# Patient Record
Sex: FEMALE | Race: WHITE | NOT HISPANIC OR LATINO
[De-identification: names, ages, dates, MRNs, and addresses within clinical notes are randomized per-mention and may not be internally consistent; named-entity substitution may affect disease eponyms.]

---

## 2018-06-18 PROBLEM — Z00.00 ENCOUNTER FOR PREVENTIVE HEALTH EXAMINATION: Status: ACTIVE | Noted: 2018-06-18

## 2018-07-24 ENCOUNTER — APPOINTMENT (OUTPATIENT)
Dept: OBGYN | Facility: CLINIC | Age: 33
End: 2018-07-24

## 2018-08-21 ENCOUNTER — APPOINTMENT (OUTPATIENT)
Dept: OBGYN | Facility: CLINIC | Age: 33
End: 2018-08-21
Payer: COMMERCIAL

## 2018-08-21 VITALS
HEIGHT: 63 IN | BODY MASS INDEX: 25.58 KG/M2 | DIASTOLIC BLOOD PRESSURE: 72 MMHG | WEIGHT: 144.38 LBS | SYSTOLIC BLOOD PRESSURE: 106 MMHG

## 2018-08-21 DIAGNOSIS — Z83.49 FAMILY HISTORY OF OTHER ENDOCRINE, NUTRITIONAL AND METABOLIC DISEASES: ICD-10-CM

## 2018-08-21 DIAGNOSIS — Z78.9 OTHER SPECIFIED HEALTH STATUS: ICD-10-CM

## 2018-08-21 DIAGNOSIS — Z83.3 FAMILY HISTORY OF DIABETES MELLITUS: ICD-10-CM

## 2018-08-21 PROCEDURE — 0501F PRENATAL FLOW SHEET: CPT

## 2018-08-21 RX ORDER — ASCORBIC ACID, CHOLECALCIFEROL, .ALPHA.-TOCOPHEROL ACETATE, DL-, PYRIDOXINE, FOLIC ACID, CYANOCOBALAMIN, CALCIUM, FERROUS FUMARATE, MAGNESIUM, DOCONEXENT 85; 200; 10; 25; 1; 12; 140; 27; 45; 300 [IU]/1; [IU]/1; [IU]/1; [IU]/1; MG/1; UG/1; MG/1; MG/1; MG/1; MG/1
CAPSULE, GELATIN COATED ORAL
Refills: 0 | Status: ACTIVE | COMMUNITY

## 2018-08-22 ENCOUNTER — ASOB RESULT (OUTPATIENT)
Age: 33
End: 2018-08-22

## 2018-08-22 ENCOUNTER — LABORATORY RESULT (OUTPATIENT)
Age: 33
End: 2018-08-22

## 2018-08-22 ENCOUNTER — APPOINTMENT (OUTPATIENT)
Dept: ANTEPARTUM | Facility: CLINIC | Age: 33
End: 2018-08-22
Payer: COMMERCIAL

## 2018-08-22 PROCEDURE — 76801 OB US < 14 WKS SINGLE FETUS: CPT

## 2018-08-22 PROCEDURE — 36416 COLLJ CAPILLARY BLOOD SPEC: CPT

## 2018-08-22 PROCEDURE — 76813 OB US NUCHAL MEAS 1 GEST: CPT

## 2018-08-28 ENCOUNTER — NON-APPOINTMENT (OUTPATIENT)
Age: 33
End: 2018-08-28

## 2018-08-28 PROBLEM — Z78.9 DOES NOT USE ILLICIT DRUGS: Status: ACTIVE | Noted: 2018-08-28

## 2018-08-28 PROBLEM — Z83.49 FAMILY HISTORY OF THYROID DISEASE: Status: ACTIVE | Noted: 2018-08-28

## 2018-08-28 PROBLEM — Z78.9 DENIES ALCOHOL CONSUMPTION: Status: ACTIVE | Noted: 2018-08-28

## 2018-08-28 PROBLEM — Z83.3 FAMILY HISTORY OF DIABETES MELLITUS: Status: ACTIVE | Noted: 2018-08-28

## 2018-08-28 PROBLEM — Z78.9 NON-SMOKER: Status: ACTIVE | Noted: 2018-08-28

## 2018-08-28 LAB
ABO + RH PNL BLD: NORMAL
B19V IGG SER QL IA: 6.2 INDEX
B19V IGG+IGM SER-IMP: NORMAL
B19V IGG+IGM SER-IMP: POSITIVE
B19V IGM FLD-ACNC: 0.2 INDEX
B19V IGM SER-ACNC: NEGATIVE
BACTERIA UR CULT: ABNORMAL
BASOPHILS # BLD AUTO: 0.02 K/UL
BASOPHILS NFR BLD AUTO: 0.3 %
BLD GP AB SCN SERPL QL: NORMAL
CMV IGG SERPL QL: <0.2 U/ML
CMV IGG SERPL-IMP: NEGATIVE
CMV IGM SERPL QL: <8 AU/ML
CMV IGM SERPL QL: NEGATIVE
EOSINOPHIL # BLD AUTO: 0.03 K/UL
EOSINOPHIL NFR BLD AUTO: 0.4 %
HBV SURFACE AG SER QL: NONREACTIVE
HCT VFR BLD CALC: 37.9 %
HCV AB SER QL: NONREACTIVE
HCV S/CO RATIO: 0.36 S/CO
HGB BLD-MCNC: 13 G/DL
HIV1+2 AB SPEC QL IA.RAPID: NONREACTIVE
IMM GRANULOCYTES NFR BLD AUTO: 0.1 %
LEAD BLD-MCNC: <1 UG/DL
LYMPHOCYTES # BLD AUTO: 1.34 K/UL
LYMPHOCYTES NFR BLD AUTO: 19 %
MAN DIFF?: NORMAL
MCHC RBC-ENTMCNC: 32.3 PG
MCHC RBC-ENTMCNC: 34.3 GM/DL
MCV RBC AUTO: 94 FL
MONOCYTES # BLD AUTO: 0.45 K/UL
MONOCYTES NFR BLD AUTO: 6.4 %
NEUTROPHILS # BLD AUTO: 5.22 K/UL
NEUTROPHILS NFR BLD AUTO: 73.8 %
PLATELET # BLD AUTO: 257 K/UL
RBC # BLD: 4.03 M/UL
RBC # FLD: 12.9 %
RUBV IGG FLD-ACNC: 4.1 INDEX
RUBV IGG SER-IMP: POSITIVE
T PALLIDUM AB SER QL IA: NEGATIVE
VZV AB TITR SER: POSITIVE
VZV IGG SER IF-ACNC: 476.7 INDEX
WBC # FLD AUTO: 7.07 K/UL

## 2018-09-25 ENCOUNTER — APPOINTMENT (OUTPATIENT)
Dept: OBGYN | Facility: CLINIC | Age: 33
End: 2018-09-25

## 2018-10-01 ENCOUNTER — APPOINTMENT (OUTPATIENT)
Dept: OBGYN | Facility: CLINIC | Age: 33
End: 2018-10-01
Payer: COMMERCIAL

## 2018-10-01 ENCOUNTER — LABORATORY RESULT (OUTPATIENT)
Age: 33
End: 2018-10-01

## 2018-10-01 VITALS
DIASTOLIC BLOOD PRESSURE: 66 MMHG | SYSTOLIC BLOOD PRESSURE: 104 MMHG | BODY MASS INDEX: 27.11 KG/M2 | WEIGHT: 153 LBS | HEIGHT: 63 IN

## 2018-10-01 PROCEDURE — 0502F SUBSEQUENT PRENATAL CARE: CPT

## 2018-10-02 ENCOUNTER — NON-APPOINTMENT (OUTPATIENT)
Age: 33
End: 2018-10-02

## 2018-10-03 ENCOUNTER — OTHER (OUTPATIENT)
Age: 33
End: 2018-10-03

## 2018-10-03 LAB — BACTERIA UR CULT: NORMAL

## 2018-10-09 ENCOUNTER — OTHER (OUTPATIENT)
Age: 33
End: 2018-10-09

## 2018-10-10 ENCOUNTER — ASOB RESULT (OUTPATIENT)
Age: 33
End: 2018-10-10

## 2018-10-10 ENCOUNTER — APPOINTMENT (OUTPATIENT)
Dept: ANTEPARTUM | Facility: CLINIC | Age: 33
End: 2018-10-10
Payer: COMMERCIAL

## 2018-10-10 PROCEDURE — 76805 OB US >/= 14 WKS SNGL FETUS: CPT

## 2018-10-15 ENCOUNTER — APPOINTMENT (OUTPATIENT)
Dept: OBGYN | Facility: CLINIC | Age: 33
End: 2018-10-15
Payer: COMMERCIAL

## 2018-10-15 PROCEDURE — 99211 OFF/OP EST MAY X REQ PHY/QHP: CPT

## 2018-10-30 ENCOUNTER — APPOINTMENT (OUTPATIENT)
Dept: OBGYN | Facility: CLINIC | Age: 33
End: 2018-10-30
Payer: COMMERCIAL

## 2018-10-30 ENCOUNTER — NON-APPOINTMENT (OUTPATIENT)
Age: 33
End: 2018-10-30

## 2018-10-30 VITALS
BODY MASS INDEX: 28 KG/M2 | WEIGHT: 158 LBS | HEIGHT: 63 IN | DIASTOLIC BLOOD PRESSURE: 73 MMHG | SYSTOLIC BLOOD PRESSURE: 116 MMHG

## 2018-10-30 LAB — BACTERIA UR CULT: NORMAL

## 2018-10-30 PROCEDURE — 0502F SUBSEQUENT PRENATAL CARE: CPT

## 2018-10-30 PROCEDURE — 90471 IMMUNIZATION ADMIN: CPT

## 2018-10-30 PROCEDURE — 90688 IIV4 VACCINE SPLT 0.5 ML IM: CPT

## 2018-11-29 ENCOUNTER — APPOINTMENT (OUTPATIENT)
Dept: OBGYN | Facility: CLINIC | Age: 33
End: 2018-11-29
Payer: COMMERCIAL

## 2018-11-29 ENCOUNTER — NON-APPOINTMENT (OUTPATIENT)
Age: 33
End: 2018-11-29

## 2018-11-29 VITALS
DIASTOLIC BLOOD PRESSURE: 64 MMHG | HEIGHT: 63 IN | SYSTOLIC BLOOD PRESSURE: 95 MMHG | WEIGHT: 164 LBS | BODY MASS INDEX: 29.06 KG/M2

## 2018-11-29 PROCEDURE — 0502F SUBSEQUENT PRENATAL CARE: CPT

## 2018-11-30 ENCOUNTER — OTHER (OUTPATIENT)
Age: 33
End: 2018-11-30

## 2018-11-30 LAB
BASOPHILS # BLD AUTO: 0.01 K/UL
BASOPHILS NFR BLD AUTO: 0.1 %
EOSINOPHIL # BLD AUTO: 0.06 K/UL
EOSINOPHIL NFR BLD AUTO: 0.5 %
GLUCOSE 1H P 50 G GLC PO SERPL-MCNC: 115 MG/DL
HCT VFR BLD CALC: 32.3 %
HGB BLD-MCNC: 11 G/DL
IMM GRANULOCYTES NFR BLD AUTO: 0.5 %
LYMPHOCYTES # BLD AUTO: 1.88 K/UL
LYMPHOCYTES NFR BLD AUTO: 16.7 %
MAN DIFF?: NORMAL
MCHC RBC-ENTMCNC: 32.9 PG
MCHC RBC-ENTMCNC: 34.1 GM/DL
MCV RBC AUTO: 96.7 FL
MONOCYTES # BLD AUTO: 0.56 K/UL
MONOCYTES NFR BLD AUTO: 5 %
NEUTROPHILS # BLD AUTO: 8.66 K/UL
NEUTROPHILS NFR BLD AUTO: 77.2 %
PLATELET # BLD AUTO: 225 K/UL
RBC # BLD: 3.34 M/UL
RBC # FLD: 13.3 %
WBC # FLD AUTO: 11.23 K/UL

## 2018-12-18 ENCOUNTER — NON-APPOINTMENT (OUTPATIENT)
Age: 33
End: 2018-12-18

## 2018-12-18 ENCOUNTER — APPOINTMENT (OUTPATIENT)
Dept: OBGYN | Facility: CLINIC | Age: 33
End: 2018-12-18
Payer: COMMERCIAL

## 2018-12-18 VITALS
HEIGHT: 63 IN | BODY MASS INDEX: 29.77 KG/M2 | DIASTOLIC BLOOD PRESSURE: 57 MMHG | WEIGHT: 168 LBS | SYSTOLIC BLOOD PRESSURE: 97 MMHG

## 2018-12-18 PROCEDURE — 90471 IMMUNIZATION ADMIN: CPT

## 2018-12-18 PROCEDURE — 0502F SUBSEQUENT PRENATAL CARE: CPT

## 2018-12-18 PROCEDURE — 90715 TDAP VACCINE 7 YRS/> IM: CPT

## 2019-01-03 ENCOUNTER — APPOINTMENT (OUTPATIENT)
Dept: OBGYN | Facility: CLINIC | Age: 34
End: 2019-01-03
Payer: COMMERCIAL

## 2019-01-03 ENCOUNTER — NON-APPOINTMENT (OUTPATIENT)
Age: 34
End: 2019-01-03

## 2019-01-03 VITALS
HEIGHT: 63 IN | WEIGHT: 170 LBS | DIASTOLIC BLOOD PRESSURE: 71 MMHG | SYSTOLIC BLOOD PRESSURE: 113 MMHG | BODY MASS INDEX: 30.12 KG/M2

## 2019-01-03 PROCEDURE — 0502F SUBSEQUENT PRENATAL CARE: CPT

## 2019-01-04 ENCOUNTER — OTHER (OUTPATIENT)
Age: 34
End: 2019-01-04

## 2019-01-07 ENCOUNTER — NON-APPOINTMENT (OUTPATIENT)
Age: 34
End: 2019-01-07

## 2019-01-07 ENCOUNTER — APPOINTMENT (OUTPATIENT)
Dept: CARDIOLOGY | Facility: CLINIC | Age: 34
End: 2019-01-07
Payer: COMMERCIAL

## 2019-01-07 VITALS
RESPIRATION RATE: 20 BRPM | OXYGEN SATURATION: 97 % | SYSTOLIC BLOOD PRESSURE: 107 MMHG | TEMPERATURE: 98.1 F | DIASTOLIC BLOOD PRESSURE: 67 MMHG | BODY MASS INDEX: 30.83 KG/M2 | HEART RATE: 100 BPM | HEIGHT: 63 IN | WEIGHT: 174 LBS

## 2019-01-07 DIAGNOSIS — R06.00 DYSPNEA, UNSPECIFIED: ICD-10-CM

## 2019-01-07 PROCEDURE — 99203 OFFICE O/P NEW LOW 30 MIN: CPT

## 2019-01-07 PROCEDURE — 93000 ELECTROCARDIOGRAM COMPLETE: CPT

## 2019-01-07 NOTE — DISCUSSION/SUMMARY
[FreeTextEntry1] : 33 year old  33 weeks pregnant who comes in for dyspnea and heartracing\par -will check TTE to ensure not cardiac in origin\par -if negative, likely due to pregnancy

## 2019-01-07 NOTE — PHYSICAL EXAM
[General Appearance - Well Developed] : well developed [Normal Appearance] : normal appearance [Well Groomed] : well groomed [General Appearance - Well Nourished] : well nourished [No Deformities] : no deformities [General Appearance - In No Acute Distress] : no acute distress [Normal Conjunctiva] : the conjunctiva exhibited no abnormalities [Eyelids - No Xanthelasma] : the eyelids demonstrated no xanthelasmas [Normal Oral Mucosa] : normal oral mucosa [No Oral Pallor] : no oral pallor [No Oral Cyanosis] : no oral cyanosis [Normal Jugular Venous A Waves Present] : normal jugular venous A waves present [Normal Jugular Venous V Waves Present] : normal jugular venous V waves present [No Jugular Venous Bradshaw A Waves] : no jugular venous bradshaw A waves [Heart Rate And Rhythm] : heart rate and rhythm were normal [Heart Sounds] : normal S1 and S2 [Murmurs] : no murmurs present [Respiration, Rhythm And Depth] : normal respiratory rhythm and effort [Exaggerated Use Of Accessory Muscles For Inspiration] : no accessory muscle use [Auscultation Breath Sounds / Voice Sounds] : lungs were clear to auscultation bilaterally [Abdomen Soft] : soft [Abdomen Tenderness] : non-tender [Abdomen Mass (___ Cm)] : no abdominal mass palpated [Nail Clubbing] : no clubbing of the fingernails [Cyanosis, Localized] : no localized cyanosis [Petechial Hemorrhages (___cm)] : no petechial hemorrhages [Skin Color & Pigmentation] : normal skin color and pigmentation [] : no rash [No Venous Stasis] : no venous stasis [Skin Lesions] : no skin lesions [No Skin Ulcers] : no skin ulcer [No Xanthoma] : no  xanthoma was observed [Oriented To Time, Place, And Person] : oriented to person, place, and time [Affect] : the affect was normal [Mood] : the mood was normal [No Anxiety] : not feeling anxious

## 2019-01-07 NOTE — HISTORY OF PRESENT ILLNESS
[FreeTextEntry1] : 33 year old woman  approx 34 weeks pregnant JESSICA 2019 who comes in for evaluation of dyspnea and heart racing. Reported this to her OB and told to followup with cardio. No family history. Was started on Iron supplementation for anemia but that has not helped with her symptoms.  States that she gets short of breath, then gets anxious and feels numbness and tingling and occasionally chest pain.

## 2019-01-09 ENCOUNTER — ASOB RESULT (OUTPATIENT)
Age: 34
End: 2019-01-09

## 2019-01-09 ENCOUNTER — APPOINTMENT (OUTPATIENT)
Dept: ANTEPARTUM | Facility: CLINIC | Age: 34
End: 2019-01-09
Payer: COMMERCIAL

## 2019-01-09 PROCEDURE — 76816 OB US FOLLOW-UP PER FETUS: CPT

## 2019-01-09 PROCEDURE — 76819 FETAL BIOPHYS PROFIL W/O NST: CPT

## 2019-01-11 ENCOUNTER — APPOINTMENT (OUTPATIENT)
Dept: CV DIAGNOSITCS | Facility: HOSPITAL | Age: 34
End: 2019-01-11
Payer: COMMERCIAL

## 2019-01-11 ENCOUNTER — OUTPATIENT (OUTPATIENT)
Dept: OUTPATIENT SERVICES | Facility: HOSPITAL | Age: 34
LOS: 1 days | End: 2019-01-11

## 2019-01-11 DIAGNOSIS — R06.00 DYSPNEA, UNSPECIFIED: ICD-10-CM

## 2019-01-11 PROCEDURE — 93306 TTE W/DOPPLER COMPLETE: CPT | Mod: 26

## 2019-01-17 ENCOUNTER — NON-APPOINTMENT (OUTPATIENT)
Age: 34
End: 2019-01-17

## 2019-01-17 ENCOUNTER — APPOINTMENT (OUTPATIENT)
Dept: OBGYN | Facility: CLINIC | Age: 34
End: 2019-01-17
Payer: COMMERCIAL

## 2019-01-17 VITALS
WEIGHT: 172.5 LBS | BODY MASS INDEX: 30.56 KG/M2 | DIASTOLIC BLOOD PRESSURE: 66 MMHG | HEIGHT: 63 IN | SYSTOLIC BLOOD PRESSURE: 116 MMHG

## 2019-01-17 PROCEDURE — 0502F SUBSEQUENT PRENATAL CARE: CPT

## 2019-01-23 ENCOUNTER — INPATIENT (INPATIENT)
Facility: HOSPITAL | Age: 34
LOS: 0 days | Discharge: HOME | End: 2019-01-24
Attending: OBSTETRICS & GYNECOLOGY | Admitting: OBSTETRICS & GYNECOLOGY
Payer: COMMERCIAL

## 2019-01-23 VITALS — HEIGHT: 63 IN | WEIGHT: 169.98 LBS

## 2019-01-23 DIAGNOSIS — Z3A.00 WEEKS OF GESTATION OF PREGNANCY NOT SPECIFIED: ICD-10-CM

## 2019-01-23 DIAGNOSIS — O26.90 PREGNANCY RELATED CONDITIONS, UNSPECIFIED, UNSPECIFIED TRIMESTER: ICD-10-CM

## 2019-01-23 LAB
APPEARANCE UR: SIGNIFICANT CHANGE UP
BACTERIA # UR AUTO: SIGNIFICANT CHANGE UP
BASOPHILS # BLD AUTO: 0.04 K/UL — SIGNIFICANT CHANGE UP (ref 0–0.2)
BASOPHILS NFR BLD AUTO: 0.3 % — SIGNIFICANT CHANGE UP (ref 0–2)
BILIRUB UR-MCNC: NEGATIVE — SIGNIFICANT CHANGE UP
BLD GP AB SCN SERPL QL: NEGATIVE — SIGNIFICANT CHANGE UP
BLOOD UR QL VISUAL: NEGATIVE — SIGNIFICANT CHANGE UP
COLOR SPEC: SIGNIFICANT CHANGE UP
EOSINOPHIL # BLD AUTO: 0.07 K/UL — SIGNIFICANT CHANGE UP (ref 0–0.5)
EOSINOPHIL NFR BLD AUTO: 0.6 % — SIGNIFICANT CHANGE UP (ref 0–6)
GLUCOSE UR-MCNC: NEGATIVE — SIGNIFICANT CHANGE UP
HCT VFR BLD CALC: 36.8 % — SIGNIFICANT CHANGE UP (ref 34.5–45)
HGB BLD-MCNC: 12.4 G/DL — SIGNIFICANT CHANGE UP (ref 11.5–15.5)
HYALINE CASTS # UR AUTO: NEGATIVE — SIGNIFICANT CHANGE UP
IMM GRANULOCYTES NFR BLD AUTO: 1 % — SIGNIFICANT CHANGE UP (ref 0–1.5)
KETONES UR-MCNC: NEGATIVE — SIGNIFICANT CHANGE UP
LEUKOCYTE ESTERASE UR-ACNC: SIGNIFICANT CHANGE UP
LYMPHOCYTES # BLD AUTO: 17.9 % — SIGNIFICANT CHANGE UP (ref 13–44)
LYMPHOCYTES # BLD AUTO: 2.24 K/UL — SIGNIFICANT CHANGE UP (ref 1–3.3)
MCHC RBC-ENTMCNC: 33 PG — SIGNIFICANT CHANGE UP (ref 27–34)
MCHC RBC-ENTMCNC: 33.7 % — SIGNIFICANT CHANGE UP (ref 32–36)
MCV RBC AUTO: 97.9 FL — SIGNIFICANT CHANGE UP (ref 80–100)
MONOCYTES # BLD AUTO: 0.99 K/UL — HIGH (ref 0–0.9)
MONOCYTES NFR BLD AUTO: 7.9 % — SIGNIFICANT CHANGE UP (ref 2–14)
NEUTROPHILS # BLD AUTO: 9.01 K/UL — HIGH (ref 1.8–7.4)
NEUTROPHILS NFR BLD AUTO: 72.3 % — SIGNIFICANT CHANGE UP (ref 43–77)
NITRITE UR-MCNC: NEGATIVE — SIGNIFICANT CHANGE UP
NRBC # FLD: 0 K/UL — LOW (ref 25–125)
PH UR: 7.5 — SIGNIFICANT CHANGE UP (ref 5–8)
PLATELET # BLD AUTO: 210 K/UL — SIGNIFICANT CHANGE UP (ref 150–400)
PMV BLD: 10.5 FL — SIGNIFICANT CHANGE UP (ref 7–13)
PROT UR-MCNC: 10 — SIGNIFICANT CHANGE UP
RBC # BLD: 3.76 M/UL — LOW (ref 3.8–5.2)
RBC # FLD: 13 % — SIGNIFICANT CHANGE UP (ref 10.3–14.5)
RBC CASTS # UR COMP ASSIST: SIGNIFICANT CHANGE UP (ref 0–?)
RH IG SCN BLD-IMP: POSITIVE — SIGNIFICANT CHANGE UP
RH IG SCN BLD-IMP: POSITIVE — SIGNIFICANT CHANGE UP
SP GR SPEC: 1.01 — SIGNIFICANT CHANGE UP (ref 1–1.04)
SQUAMOUS # UR AUTO: SIGNIFICANT CHANGE UP
T PALLIDUM AB TITR SER: NEGATIVE — SIGNIFICANT CHANGE UP
UROBILINOGEN FLD QL: NORMAL — SIGNIFICANT CHANGE UP
WBC # BLD: 12.48 K/UL — HIGH (ref 3.8–10.5)
WBC # FLD AUTO: 12.48 K/UL — HIGH (ref 3.8–10.5)
WBC UR QL: >50 — HIGH (ref 0–?)

## 2019-01-23 PROCEDURE — 99222 1ST HOSP IP/OBS MODERATE 55: CPT

## 2019-01-23 RX ORDER — SODIUM CHLORIDE 9 MG/ML
1000 INJECTION, SOLUTION INTRAVENOUS ONCE
Qty: 0 | Refills: 0 | Status: COMPLETED | OUTPATIENT
Start: 2019-01-23 | End: 2019-01-23

## 2019-01-23 RX ORDER — SODIUM CHLORIDE 9 MG/ML
1000 INJECTION, SOLUTION INTRAVENOUS
Qty: 0 | Refills: 0 | Status: DISCONTINUED | OUTPATIENT
Start: 2019-01-23 | End: 2019-01-23

## 2019-01-23 RX ORDER — CEFAZOLIN SODIUM 1 G
1000 VIAL (EA) INJECTION EVERY 8 HOURS
Qty: 0 | Refills: 0 | Status: DISCONTINUED | OUTPATIENT
Start: 2019-01-23 | End: 2019-01-24

## 2019-01-23 RX ORDER — SODIUM CHLORIDE 9 MG/ML
1000 INJECTION, SOLUTION INTRAVENOUS ONCE
Qty: 0 | Refills: 0 | Status: DISCONTINUED | OUTPATIENT
Start: 2019-01-23 | End: 2019-01-23

## 2019-01-23 RX ORDER — OXYTOCIN 10 UNIT/ML
333.33 VIAL (ML) INJECTION
Qty: 20 | Refills: 0 | Status: DISCONTINUED | OUTPATIENT
Start: 2019-01-23 | End: 2019-01-23

## 2019-01-23 RX ORDER — CEFAZOLIN SODIUM 1 G
2000 VIAL (EA) INJECTION ONCE
Qty: 0 | Refills: 0 | Status: COMPLETED | OUTPATIENT
Start: 2019-01-23 | End: 2019-01-23

## 2019-01-23 RX ORDER — CEFAZOLIN SODIUM 1 G
VIAL (EA) INJECTION
Qty: 0 | Refills: 0 | Status: DISCONTINUED | OUTPATIENT
Start: 2019-01-23 | End: 2019-01-23

## 2019-01-23 RX ADMIN — Medication 100 MILLIGRAM(S): at 23:00

## 2019-01-23 RX ADMIN — Medication 12 MILLIGRAM(S): at 05:35

## 2019-01-23 RX ADMIN — SODIUM CHLORIDE 1000 MILLILITER(S): 9 INJECTION, SOLUTION INTRAVENOUS at 05:24

## 2019-01-23 RX ADMIN — Medication 100 MILLIGRAM(S): at 05:22

## 2019-01-23 RX ADMIN — Medication 100 MILLIGRAM(S): at 13:56

## 2019-01-24 ENCOUNTER — TRANSCRIPTION ENCOUNTER (OUTPATIENT)
Age: 34
End: 2019-01-24

## 2019-01-24 ENCOUNTER — APPOINTMENT (OUTPATIENT)
Dept: ANTEPARTUM | Facility: CLINIC | Age: 34
End: 2019-01-24
Payer: COMMERCIAL

## 2019-01-24 ENCOUNTER — ASOB RESULT (OUTPATIENT)
Age: 34
End: 2019-01-24

## 2019-01-24 ENCOUNTER — OUTPATIENT (OUTPATIENT)
Dept: OUTPATIENT SERVICES | Facility: HOSPITAL | Age: 34
LOS: 1 days | End: 2019-01-24

## 2019-01-24 VITALS
SYSTOLIC BLOOD PRESSURE: 99 MMHG | TEMPERATURE: 99 F | RESPIRATION RATE: 17 BRPM | OXYGEN SATURATION: 97 % | DIASTOLIC BLOOD PRESSURE: 55 MMHG | HEART RATE: 73 BPM

## 2019-01-24 LAB
BACTERIA UR CULT: SIGNIFICANT CHANGE UP
SPECIMEN SOURCE: SIGNIFICANT CHANGE UP
SPECIMEN SOURCE: SIGNIFICANT CHANGE UP

## 2019-01-24 PROCEDURE — 76819 FETAL BIOPHYS PROFIL W/O NST: CPT | Mod: 26

## 2019-01-24 RX ADMIN — Medication 12 MILLIGRAM(S): at 05:43

## 2019-01-24 RX ADMIN — Medication 100 MILLIGRAM(S): at 05:43

## 2019-01-24 NOTE — DISCHARGE NOTE ANTEPARTUM - PATIENT PORTAL LINK FT
You can access the [facility] Patient Portal, offered by [facility], by registering with the following website: [email or web address]

## 2019-01-24 NOTE — DISCHARGE NOTE ANTEPARTUM - CARE PLAN
Principal Discharge DX:	 labor in third trimester without delivery  Goal:	continuation of pregnancy and prenatal care Principal Discharge DX:	 labor in third trimester without delivery  Goal:	continuation of pregnancy and prenatal care  Assessment and plan of treatment:	F/u as scheduled

## 2019-01-24 NOTE — DISCHARGE NOTE ANTEPARTUM - MEDICATION SUMMARY - MEDICATIONS TO TAKE
I will START or STAY ON the medications listed below when I get home from the hospital:    prenatal vitamins  -- 1 tab(s) by mouth once a day  -- Indication: For vitamin    ferrous sulfate 325 mg (65 mg elemental iron) oral tablet  -- 1 tab(s) by mouth 3 times a day  -- Indication: For vitamin

## 2019-01-24 NOTE — DISCHARGE NOTE ANTEPARTUM - HOSPITAL COURSE
Pt presented at 35w4d presented with ctx and was found to be 3/70/-3. She received BMZ on 1/23 and 1/24. She received Ancef for GBS prophylaxis. She no longer felt ctx and was discharged home in stable condition with outpatient follow up. VE unchanged prior to admission.

## 2019-01-24 NOTE — DISCHARGE NOTE ANTEPARTUM - CARE PROVIDER_API CALL
Swathi William (MD), Obstetrics and Gynecology  [facility] [place] [address]  Phone: [phone]  Fax: [phone]

## 2019-01-25 LAB — GP B STREP GENITAL QL CULT: SIGNIFICANT CHANGE UP

## 2019-01-30 DIAGNOSIS — Z3A.35 35 WEEKS GESTATION OF PREGNANCY: ICD-10-CM

## 2019-01-30 DIAGNOSIS — O26.843 UTERINE SIZE-DATE DISCREPANCY, THIRD TRIMESTER: ICD-10-CM

## 2019-01-30 DIAGNOSIS — O60.03 PRETERM LABOR WITHOUT DELIVERY, THIRD TRIMESTER: ICD-10-CM

## 2019-01-31 ENCOUNTER — APPOINTMENT (OUTPATIENT)
Dept: OBGYN | Facility: CLINIC | Age: 34
End: 2019-01-31
Payer: COMMERCIAL

## 2019-01-31 ENCOUNTER — NON-APPOINTMENT (OUTPATIENT)
Age: 34
End: 2019-01-31

## 2019-01-31 VITALS
BODY MASS INDEX: 31.01 KG/M2 | HEIGHT: 63 IN | WEIGHT: 175 LBS | DIASTOLIC BLOOD PRESSURE: 80 MMHG | SYSTOLIC BLOOD PRESSURE: 121 MMHG

## 2019-01-31 PROCEDURE — 0502F SUBSEQUENT PRENATAL CARE: CPT

## 2019-02-04 LAB — BACTERIA UR CULT: NORMAL

## 2019-02-07 ENCOUNTER — APPOINTMENT (OUTPATIENT)
Dept: OBGYN | Facility: CLINIC | Age: 34
End: 2019-02-07
Payer: COMMERCIAL

## 2019-02-07 ENCOUNTER — NON-APPOINTMENT (OUTPATIENT)
Age: 34
End: 2019-02-07

## 2019-02-07 VITALS
BODY MASS INDEX: 32.25 KG/M2 | SYSTOLIC BLOOD PRESSURE: 117 MMHG | WEIGHT: 182 LBS | HEIGHT: 63 IN | DIASTOLIC BLOOD PRESSURE: 78 MMHG

## 2019-02-07 PROCEDURE — 0502F SUBSEQUENT PRENATAL CARE: CPT

## 2019-02-10 LAB — BACTERIA UR CULT: NORMAL

## 2019-02-12 ENCOUNTER — APPOINTMENT (OUTPATIENT)
Dept: CARDIOLOGY | Facility: CLINIC | Age: 34
End: 2019-02-12

## 2019-02-14 ENCOUNTER — APPOINTMENT (OUTPATIENT)
Dept: OBGYN | Facility: CLINIC | Age: 34
End: 2019-02-14
Payer: COMMERCIAL

## 2019-02-14 VITALS
WEIGHT: 185 LBS | BODY MASS INDEX: 32.78 KG/M2 | SYSTOLIC BLOOD PRESSURE: 107 MMHG | HEIGHT: 63 IN | DIASTOLIC BLOOD PRESSURE: 71 MMHG

## 2019-02-14 PROCEDURE — 0502F SUBSEQUENT PRENATAL CARE: CPT

## 2019-02-14 PROCEDURE — 59025 FETAL NON-STRESS TEST: CPT

## 2019-02-18 ENCOUNTER — OUTPATIENT (OUTPATIENT)
Dept: OUTPATIENT SERVICES | Facility: HOSPITAL | Age: 34
LOS: 1 days | End: 2019-02-18
Payer: COMMERCIAL

## 2019-02-18 DIAGNOSIS — O26.899 OTHER SPECIFIED PREGNANCY RELATED CONDITIONS, UNSPECIFIED TRIMESTER: ICD-10-CM

## 2019-02-18 DIAGNOSIS — Z3A.00 WEEKS OF GESTATION OF PREGNANCY NOT SPECIFIED: ICD-10-CM

## 2019-02-18 LAB — AMNISURE ROM (RUPTURE OF MEMBRANES): NEGATIVE — SIGNIFICANT CHANGE UP

## 2019-02-18 PROCEDURE — 59025 FETAL NON-STRESS TEST: CPT | Mod: 26

## 2019-02-19 ENCOUNTER — INPATIENT (INPATIENT)
Facility: HOSPITAL | Age: 34
LOS: 2 days | Discharge: HOME | End: 2019-02-22
Attending: OBSTETRICS & GYNECOLOGY | Admitting: OBSTETRICS & GYNECOLOGY
Payer: COMMERCIAL

## 2019-02-19 VITALS — WEIGHT: 180.78 LBS | HEIGHT: 63 IN

## 2019-02-19 DIAGNOSIS — O26.899 OTHER SPECIFIED PREGNANCY RELATED CONDITIONS, UNSPECIFIED TRIMESTER: ICD-10-CM

## 2019-02-19 DIAGNOSIS — Z3A.00 WEEKS OF GESTATION OF PREGNANCY NOT SPECIFIED: ICD-10-CM

## 2019-02-19 LAB
BASOPHILS # BLD AUTO: 0.03 K/UL — SIGNIFICANT CHANGE UP (ref 0–0.2)
BASOPHILS NFR BLD AUTO: 0.3 % — SIGNIFICANT CHANGE UP (ref 0–2)
BLD GP AB SCN SERPL QL: NEGATIVE — SIGNIFICANT CHANGE UP
EOSINOPHIL # BLD AUTO: 0.07 K/UL — SIGNIFICANT CHANGE UP (ref 0–0.5)
EOSINOPHIL NFR BLD AUTO: 0.6 % — SIGNIFICANT CHANGE UP (ref 0–6)
HCT VFR BLD CALC: 38.1 % — SIGNIFICANT CHANGE UP (ref 34.5–45)
HGB BLD-MCNC: 13 G/DL — SIGNIFICANT CHANGE UP (ref 11.5–15.5)
IMM GRANULOCYTES NFR BLD AUTO: 0.8 % — SIGNIFICANT CHANGE UP (ref 0–1.5)
LYMPHOCYTES # BLD AUTO: 1.77 K/UL — SIGNIFICANT CHANGE UP (ref 1–3.3)
LYMPHOCYTES # BLD AUTO: 15.4 % — SIGNIFICANT CHANGE UP (ref 13–44)
MCHC RBC-ENTMCNC: 32.7 PG — SIGNIFICANT CHANGE UP (ref 27–34)
MCHC RBC-ENTMCNC: 34.1 % — SIGNIFICANT CHANGE UP (ref 32–36)
MCV RBC AUTO: 95.7 FL — SIGNIFICANT CHANGE UP (ref 80–100)
MONOCYTES # BLD AUTO: 0.92 K/UL — HIGH (ref 0–0.9)
MONOCYTES NFR BLD AUTO: 8 % — SIGNIFICANT CHANGE UP (ref 2–14)
NEUTROPHILS # BLD AUTO: 8.62 K/UL — HIGH (ref 1.8–7.4)
NEUTROPHILS NFR BLD AUTO: 74.9 % — SIGNIFICANT CHANGE UP (ref 43–77)
NRBC # FLD: 0 K/UL — LOW (ref 25–125)
PLATELET # BLD AUTO: 235 K/UL — SIGNIFICANT CHANGE UP (ref 150–400)
PMV BLD: 10.6 FL — SIGNIFICANT CHANGE UP (ref 7–13)
RBC # BLD: 3.98 M/UL — SIGNIFICANT CHANGE UP (ref 3.8–5.2)
RBC # FLD: 12.7 % — SIGNIFICANT CHANGE UP (ref 10.3–14.5)
RH IG SCN BLD-IMP: POSITIVE — SIGNIFICANT CHANGE UP
WBC # BLD: 11.5 K/UL — HIGH (ref 3.8–10.5)
WBC # FLD AUTO: 11.5 K/UL — HIGH (ref 3.8–10.5)

## 2019-02-19 RX ORDER — OXYTOCIN 10 UNIT/ML
333.33 VIAL (ML) INJECTION
Qty: 20 | Refills: 0 | Status: COMPLETED | OUTPATIENT
Start: 2019-02-19

## 2019-02-19 RX ORDER — SODIUM CHLORIDE 9 MG/ML
1000 INJECTION, SOLUTION INTRAVENOUS
Qty: 0 | Refills: 0 | Status: DISCONTINUED | OUTPATIENT
Start: 2019-02-19 | End: 2019-02-20

## 2019-02-19 RX ORDER — SODIUM CHLORIDE 9 MG/ML
1000 INJECTION, SOLUTION INTRAVENOUS ONCE
Qty: 0 | Refills: 0 | Status: COMPLETED | OUTPATIENT
Start: 2019-02-19 | End: 2019-02-19

## 2019-02-19 RX ADMIN — SODIUM CHLORIDE 250 MILLILITER(S): 9 INJECTION, SOLUTION INTRAVENOUS at 22:00

## 2019-02-19 RX ADMIN — SODIUM CHLORIDE 2000 MILLILITER(S): 9 INJECTION, SOLUTION INTRAVENOUS at 21:30

## 2019-02-20 LAB — T PALLIDUM AB TITR SER: NEGATIVE — SIGNIFICANT CHANGE UP

## 2019-02-20 PROCEDURE — 59400 OBSTETRICAL CARE: CPT | Mod: U9,UB

## 2019-02-20 RX ORDER — OXYCODONE HYDROCHLORIDE 5 MG/1
5 TABLET ORAL EVERY 4 HOURS
Qty: 0 | Refills: 0 | Status: DISCONTINUED | OUTPATIENT
Start: 2019-02-20 | End: 2019-02-22

## 2019-02-20 RX ORDER — IBUPROFEN 200 MG
600 TABLET ORAL EVERY 6 HOURS
Qty: 0 | Refills: 0 | Status: DISCONTINUED | OUTPATIENT
Start: 2019-02-20 | End: 2019-02-22

## 2019-02-20 RX ORDER — ACETAMINOPHEN 500 MG
975 TABLET ORAL EVERY 6 HOURS
Qty: 0 | Refills: 0 | Status: DISCONTINUED | OUTPATIENT
Start: 2019-02-20 | End: 2019-02-22

## 2019-02-20 RX ORDER — SODIUM CHLORIDE 9 MG/ML
3 INJECTION INTRAMUSCULAR; INTRAVENOUS; SUBCUTANEOUS EVERY 8 HOURS
Qty: 0 | Refills: 0 | Status: DISCONTINUED | OUTPATIENT
Start: 2019-02-20 | End: 2019-02-22

## 2019-02-20 RX ORDER — OXYTOCIN 10 UNIT/ML
41.67 VIAL (ML) INJECTION
Qty: 20 | Refills: 0 | Status: DISCONTINUED | OUTPATIENT
Start: 2019-02-20 | End: 2019-02-20

## 2019-02-20 RX ORDER — ACETAMINOPHEN 500 MG
975 TABLET ORAL EVERY 6 HOURS
Qty: 0 | Refills: 0 | Status: COMPLETED | OUTPATIENT
Start: 2019-02-20 | End: 2020-01-19

## 2019-02-20 RX ORDER — OXYCODONE HYDROCHLORIDE 5 MG/1
5 TABLET ORAL
Qty: 0 | Refills: 0 | Status: DISCONTINUED | OUTPATIENT
Start: 2019-02-20 | End: 2019-02-22

## 2019-02-20 RX ORDER — PRAMOXINE HYDROCHLORIDE 150 MG/15G
1 AEROSOL, FOAM RECTAL EVERY 4 HOURS
Qty: 0 | Refills: 0 | Status: DISCONTINUED | OUTPATIENT
Start: 2019-02-20 | End: 2019-02-20

## 2019-02-20 RX ORDER — SODIUM CHLORIDE 9 MG/ML
3 INJECTION INTRAMUSCULAR; INTRAVENOUS; SUBCUTANEOUS EVERY 8 HOURS
Qty: 0 | Refills: 0 | Status: DISCONTINUED | OUTPATIENT
Start: 2019-02-20 | End: 2019-02-20

## 2019-02-20 RX ORDER — DIBUCAINE 1 %
1 OINTMENT (GRAM) RECTAL EVERY 4 HOURS
Qty: 0 | Refills: 0 | Status: DISCONTINUED | OUTPATIENT
Start: 2019-02-20 | End: 2019-02-22

## 2019-02-20 RX ORDER — GLYCERIN ADULT
1 SUPPOSITORY, RECTAL RECTAL AT BEDTIME
Qty: 0 | Refills: 0 | Status: DISCONTINUED | OUTPATIENT
Start: 2019-02-20 | End: 2019-02-22

## 2019-02-20 RX ORDER — DIBUCAINE 1 %
1 OINTMENT (GRAM) RECTAL EVERY 4 HOURS
Qty: 0 | Refills: 0 | Status: DISCONTINUED | OUTPATIENT
Start: 2019-02-20 | End: 2019-02-20

## 2019-02-20 RX ORDER — IBUPROFEN 200 MG
600 TABLET ORAL EVERY 6 HOURS
Qty: 0 | Refills: 0 | Status: COMPLETED | OUTPATIENT
Start: 2019-02-20 | End: 2020-01-19

## 2019-02-20 RX ORDER — HYDROCORTISONE 1 %
1 OINTMENT (GRAM) TOPICAL EVERY 4 HOURS
Qty: 0 | Refills: 0 | Status: DISCONTINUED | OUTPATIENT
Start: 2019-02-20 | End: 2019-02-20

## 2019-02-20 RX ORDER — PRAMOXINE HYDROCHLORIDE 150 MG/15G
1 AEROSOL, FOAM RECTAL EVERY 4 HOURS
Qty: 0 | Refills: 0 | Status: DISCONTINUED | OUTPATIENT
Start: 2019-02-20 | End: 2019-02-22

## 2019-02-20 RX ORDER — AER TRAVELER 0.5 G/1
1 SOLUTION RECTAL; TOPICAL EVERY 4 HOURS
Qty: 0 | Refills: 0 | Status: DISCONTINUED | OUTPATIENT
Start: 2019-02-20 | End: 2019-02-20

## 2019-02-20 RX ORDER — HYDROCORTISONE 1 %
1 OINTMENT (GRAM) TOPICAL EVERY 4 HOURS
Qty: 0 | Refills: 0 | Status: DISCONTINUED | OUTPATIENT
Start: 2019-02-20 | End: 2019-02-22

## 2019-02-20 RX ORDER — TETANUS TOXOID, REDUCED DIPHTHERIA TOXOID AND ACELLULAR PERTUSSIS VACCINE, ADSORBED 5; 2.5; 8; 8; 2.5 [IU]/.5ML; [IU]/.5ML; UG/.5ML; UG/.5ML; UG/.5ML
0.5 SUSPENSION INTRAMUSCULAR ONCE
Qty: 0 | Refills: 0 | Status: DISCONTINUED | OUTPATIENT
Start: 2019-02-20 | End: 2019-02-22

## 2019-02-20 RX ORDER — SIMETHICONE 80 MG/1
80 TABLET, CHEWABLE ORAL EVERY 6 HOURS
Qty: 0 | Refills: 0 | Status: DISCONTINUED | OUTPATIENT
Start: 2019-02-20 | End: 2019-02-22

## 2019-02-20 RX ORDER — AER TRAVELER 0.5 G/1
1 SOLUTION RECTAL; TOPICAL EVERY 4 HOURS
Qty: 0 | Refills: 0 | Status: DISCONTINUED | OUTPATIENT
Start: 2019-02-20 | End: 2019-02-22

## 2019-02-20 RX ORDER — LANOLIN
1 OINTMENT (GRAM) TOPICAL EVERY 6 HOURS
Qty: 0 | Refills: 0 | Status: DISCONTINUED | OUTPATIENT
Start: 2019-02-20 | End: 2019-02-22

## 2019-02-20 RX ORDER — DIPHENHYDRAMINE HCL 50 MG
25 CAPSULE ORAL EVERY 6 HOURS
Qty: 0 | Refills: 0 | Status: DISCONTINUED | OUTPATIENT
Start: 2019-02-20 | End: 2019-02-22

## 2019-02-20 RX ORDER — OXYTOCIN 10 UNIT/ML
333.33 VIAL (ML) INJECTION
Qty: 20 | Refills: 0 | Status: COMPLETED | OUTPATIENT
Start: 2019-02-20 | End: 2019-02-20

## 2019-02-20 RX ORDER — MAGNESIUM HYDROXIDE 400 MG/1
30 TABLET, CHEWABLE ORAL
Qty: 0 | Refills: 0 | Status: DISCONTINUED | OUTPATIENT
Start: 2019-02-20 | End: 2019-02-22

## 2019-02-20 RX ORDER — DOCUSATE SODIUM 100 MG
100 CAPSULE ORAL
Qty: 0 | Refills: 0 | Status: DISCONTINUED | OUTPATIENT
Start: 2019-02-20 | End: 2019-02-22

## 2019-02-20 RX ORDER — KETOROLAC TROMETHAMINE 30 MG/ML
30 SYRINGE (ML) INJECTION ONCE
Qty: 0 | Refills: 0 | Status: DISCONTINUED | OUTPATIENT
Start: 2019-02-20 | End: 2019-02-20

## 2019-02-20 RX ADMIN — Medication 975 MILLIGRAM(S): at 19:30

## 2019-02-20 RX ADMIN — Medication 600 MILLIGRAM(S): at 17:47

## 2019-02-20 RX ADMIN — Medication 30 MILLIGRAM(S): at 11:51

## 2019-02-20 RX ADMIN — SODIUM CHLORIDE 3 MILLILITER(S): 9 INJECTION INTRAMUSCULAR; INTRAVENOUS; SUBCUTANEOUS at 14:19

## 2019-02-20 RX ADMIN — Medication 1000 MILLIUNIT(S)/MIN: at 11:01

## 2019-02-20 RX ADMIN — Medication 975 MILLIGRAM(S): at 17:47

## 2019-02-20 RX ADMIN — Medication 600 MILLIGRAM(S): at 19:30

## 2019-02-20 RX ADMIN — SODIUM CHLORIDE 3 MILLILITER(S): 9 INJECTION INTRAMUSCULAR; INTRAVENOUS; SUBCUTANEOUS at 22:00

## 2019-02-20 RX ADMIN — SODIUM CHLORIDE 250 MILLILITER(S): 9 INJECTION, SOLUTION INTRAVENOUS at 08:03

## 2019-02-20 RX ADMIN — Medication 125 MILLIUNIT(S)/MIN: at 11:01

## 2019-02-20 NOTE — LACTATION INITIAL EVALUATION - LACTATION INTERVENTIONS
Assisted with positioning to facilitate deep latch.  Encouraged to feed based on cues and follow the feeding log, reviewed.  Taught hand expression.  Encouraged to call for assistance, as needed.  Discussed outpatient resources available, Warm line, breastfeeding support group.  Primary RN made aware of consult./initiate skin to skin/initiate hand expression routine

## 2019-02-21 ENCOUNTER — APPOINTMENT (OUTPATIENT)
Dept: OBGYN | Facility: CLINIC | Age: 34
End: 2019-02-21

## 2019-02-21 ENCOUNTER — TRANSCRIPTION ENCOUNTER (OUTPATIENT)
Age: 34
End: 2019-02-21

## 2019-02-21 RX ORDER — IBUPROFEN 200 MG
1 TABLET ORAL
Qty: 0 | Refills: 0 | DISCHARGE
Start: 2019-02-21

## 2019-02-21 RX ORDER — FERROUS SULFATE 325(65) MG
1 TABLET ORAL
Qty: 0 | Refills: 0 | COMMUNITY

## 2019-02-21 RX ORDER — ACETAMINOPHEN 500 MG
3 TABLET ORAL
Qty: 0 | Refills: 0 | DISCHARGE
Start: 2019-02-21

## 2019-02-21 RX ADMIN — Medication 600 MILLIGRAM(S): at 06:43

## 2019-02-21 RX ADMIN — Medication 600 MILLIGRAM(S): at 19:15

## 2019-02-21 RX ADMIN — Medication 600 MILLIGRAM(S): at 18:15

## 2019-02-21 RX ADMIN — SODIUM CHLORIDE 3 MILLILITER(S): 9 INJECTION INTRAMUSCULAR; INTRAVENOUS; SUBCUTANEOUS at 06:12

## 2019-02-21 RX ADMIN — Medication 600 MILLIGRAM(S): at 23:56

## 2019-02-21 RX ADMIN — Medication 975 MILLIGRAM(S): at 23:56

## 2019-02-21 RX ADMIN — Medication 975 MILLIGRAM(S): at 06:11

## 2019-02-21 RX ADMIN — Medication 975 MILLIGRAM(S): at 06:43

## 2019-02-21 RX ADMIN — Medication 975 MILLIGRAM(S): at 19:15

## 2019-02-21 RX ADMIN — Medication 600 MILLIGRAM(S): at 01:21

## 2019-02-21 RX ADMIN — Medication 600 MILLIGRAM(S): at 12:00

## 2019-02-21 RX ADMIN — Medication 975 MILLIGRAM(S): at 00:17

## 2019-02-21 RX ADMIN — Medication 975 MILLIGRAM(S): at 01:20

## 2019-02-21 RX ADMIN — Medication 600 MILLIGRAM(S): at 06:11

## 2019-02-21 RX ADMIN — Medication 975 MILLIGRAM(S): at 12:00

## 2019-02-21 RX ADMIN — Medication 975 MILLIGRAM(S): at 13:00

## 2019-02-21 RX ADMIN — Medication 975 MILLIGRAM(S): at 18:15

## 2019-02-21 RX ADMIN — Medication 600 MILLIGRAM(S): at 13:00

## 2019-02-21 RX ADMIN — Medication 600 MILLIGRAM(S): at 00:17

## 2019-02-21 NOTE — PROGRESS NOTE ADULT - SUBJECTIVE AND OBJECTIVE BOX
S: Patient doing well. Minimal lochia. Pain controlled. breastfeeding    O: Vital Signs Last 24 Hrs  T(C): 36.9 (2019 05:34), Max: 37 (2019 14:17)  T(F): 98.4 (2019 05:34), Max: 98.6 (2019 14:17)  HR: 98 (2019 05:34) (97 - 98)  BP: 130/80 (2019 05:34) (112/62 - 130/80)  BP(mean): --  RR: 18 (2019 05:34) (16 - 18)  SpO2: 98% (2019 05:34) (98% - 100%)    Gen: NAD  Abd: soft, NT, ND, fundus firm below umbilicus  Ext: no tenderness    Labs:                        13.0   11.50 )-----------( 235      ( 2019 20:45 )             38.1       A: 33y PPD#1 s/p  doing well.     Plan: Continue routine postpartum care. Anticipate d/c home in am.

## 2019-02-21 NOTE — DISCHARGE NOTE OB - HOSPITAL COURSE
Patient status post normal vaginal delivery of liveborn female infant.   Upon discharge, patient is spontaneously voiding, tolerating a regular diet, out of bed, and reports adequate pain control

## 2019-02-21 NOTE — DISCHARGE NOTE OB - PATIENT PORTAL LINK FT
You can access the [facility] Patient Portal, offered by [facility], by registering with the following website: [email or web address]

## 2019-02-21 NOTE — DISCHARGE NOTE OB - CARE PLAN
Principal Discharge DX:	Vaginal delivery  Goal:	return to normal activity  Assessment and plan of treatment:	diet and activity as tolerated

## 2019-02-21 NOTE — DISCHARGE NOTE OB - MATERIALS PROVIDED
Vaccinations/Shaken Baby Prevention Handout/Breastfeeding Guide and Packet/[facility] [facility]/[place]  Immunization Record

## 2019-02-21 NOTE — DISCHARGE NOTE OB - CARE PROVIDER_API CALL
Swathi William (MD)  Obstetrics and Gynecology  [facility] [place] [address]  Phone: [phone]  Fax: [phone]  Follow Up Time:

## 2019-02-22 VITALS
TEMPERATURE: 98 F | RESPIRATION RATE: 18 BRPM | DIASTOLIC BLOOD PRESSURE: 55 MMHG | HEART RATE: 76 BPM | OXYGEN SATURATION: 98 % | SYSTOLIC BLOOD PRESSURE: 104 MMHG

## 2019-02-22 RX ADMIN — Medication 600 MILLIGRAM(S): at 00:30

## 2019-02-22 RX ADMIN — Medication 975 MILLIGRAM(S): at 06:46

## 2019-02-22 RX ADMIN — Medication 975 MILLIGRAM(S): at 07:20

## 2019-02-22 RX ADMIN — Medication 600 MILLIGRAM(S): at 06:46

## 2019-02-22 RX ADMIN — Medication 975 MILLIGRAM(S): at 00:30

## 2019-02-22 RX ADMIN — Medication 600 MILLIGRAM(S): at 12:10

## 2019-02-22 RX ADMIN — Medication 600 MILLIGRAM(S): at 07:20

## 2019-03-01 ENCOUNTER — OTHER (OUTPATIENT)
Age: 34
End: 2019-03-01

## 2019-04-03 ENCOUNTER — APPOINTMENT (OUTPATIENT)
Dept: OBGYN | Facility: CLINIC | Age: 34
End: 2019-04-03
Payer: COMMERCIAL

## 2019-04-03 VITALS
HEIGHT: 63 IN | SYSTOLIC BLOOD PRESSURE: 116 MMHG | HEART RATE: 97 BPM | WEIGHT: 155 LBS | BODY MASS INDEX: 27.46 KG/M2 | DIASTOLIC BLOOD PRESSURE: 77 MMHG

## 2019-04-03 DIAGNOSIS — N63.20 UNSPECIFIED LUMP IN THE LEFT BREAST, UNSPECIFIED QUADRANT: ICD-10-CM

## 2019-04-03 PROCEDURE — 0503F POSTPARTUM CARE VISIT: CPT

## 2019-04-03 NOTE — HISTORY OF PRESENT ILLNESS
[Postpartum Follow Up] : postpartum follow up [Delivery Date: ___] : on [unfilled] [] : delivered by vaginal delivery [Female] : Delivery History: baby girl [Wt. ___] : weighing [unfilled] [Pertussis Vaccine] : Pertussis vaccine administered [Girl] : baby is a girl [Infant's Name ___] : [unfilled] [___ Lbs] : [unfilled] lbs [___ Oz] : [unfilled] oz [Living at Home] : is currently living at home [Bottle Feeding] : bottle feeding [Back to Normal] : is back to normal in size [None] : no vaginal bleeding [Normal] : the vagina was normal [Healing Well] : is healing well [The Right Breast Was Examined] : was normal [Soft] : soft [Complications:___] : no complications [Rhogam] : Rhogam was not administered [Rubella Vaccine] : Rubella vaccine was not administered [BTL] : no tubal ligation [Breastfeeding] : not currently nursing [BF with Difficulty] : nursing without difficulty [Resumed Menses] : has not resumed her menses [Resumed Narberth] : has not resumed intercourse [Intended Contraception] : the patient does not intended to use contraception postpartum [Breast Pain] : no breast pain [S/Sx PP Depression] : no signs/symptoms of postpartum depression [Tender] : non tender [Distended] : not distended

## 2019-04-18 ENCOUNTER — OUTPATIENT (OUTPATIENT)
Dept: OUTPATIENT SERVICES | Facility: HOSPITAL | Age: 34
LOS: 1 days | End: 2019-04-18
Payer: COMMERCIAL

## 2019-04-18 ENCOUNTER — OTHER (OUTPATIENT)
Age: 34
End: 2019-04-18

## 2019-04-18 ENCOUNTER — APPOINTMENT (OUTPATIENT)
Dept: ULTRASOUND IMAGING | Facility: CLINIC | Age: 34
End: 2019-04-18
Payer: COMMERCIAL

## 2019-04-18 DIAGNOSIS — N63.20 UNSPECIFIED LUMP IN THE LEFT BREAST, UNSPECIFIED QUADRANT: ICD-10-CM

## 2019-04-18 PROCEDURE — 76642 ULTRASOUND BREAST LIMITED: CPT | Mod: 26,LT

## 2019-04-18 PROCEDURE — 76642 ULTRASOUND BREAST LIMITED: CPT

## 2019-04-22 ENCOUNTER — RESULT REVIEW (OUTPATIENT)
Age: 34
End: 2019-04-22

## 2019-04-22 ENCOUNTER — OUTPATIENT (OUTPATIENT)
Dept: OUTPATIENT SERVICES | Facility: HOSPITAL | Age: 34
LOS: 1 days | End: 2019-04-22
Payer: COMMERCIAL

## 2019-04-22 ENCOUNTER — APPOINTMENT (OUTPATIENT)
Dept: ULTRASOUND IMAGING | Facility: CLINIC | Age: 34
End: 2019-04-22
Payer: COMMERCIAL

## 2019-04-22 DIAGNOSIS — N60.02 SOLITARY CYST OF LEFT BREAST: ICD-10-CM

## 2019-04-22 DIAGNOSIS — Z00.8 ENCOUNTER FOR OTHER GENERAL EXAMINATION: ICD-10-CM

## 2019-04-22 PROCEDURE — 19083 BX BREAST 1ST LESION US IMAG: CPT

## 2019-04-22 PROCEDURE — 88305 TISSUE EXAM BY PATHOLOGIST: CPT

## 2019-04-22 PROCEDURE — 19083 BX BREAST 1ST LESION US IMAG: CPT | Mod: LT

## 2019-04-22 PROCEDURE — 88305 TISSUE EXAM BY PATHOLOGIST: CPT | Mod: 26

## 2019-04-24 ENCOUNTER — APPOINTMENT (OUTPATIENT)
Dept: OBGYN | Facility: CLINIC | Age: 34
End: 2019-04-24
Payer: COMMERCIAL

## 2019-04-24 ENCOUNTER — EMERGENCY (EMERGENCY)
Facility: HOSPITAL | Age: 34
LOS: 1 days | Discharge: HOME | End: 2019-04-24
Attending: EMERGENCY MEDICINE | Admitting: EMERGENCY MEDICINE
Payer: COMMERCIAL

## 2019-04-24 VITALS
TEMPERATURE: 99 F | HEART RATE: 93 BPM | OXYGEN SATURATION: 100 % | RESPIRATION RATE: 16 BRPM | SYSTOLIC BLOOD PRESSURE: 112 MMHG | DIASTOLIC BLOOD PRESSURE: 68 MMHG

## 2019-04-24 VITALS
BODY MASS INDEX: 28.53 KG/M2 | WEIGHT: 161 LBS | HEART RATE: 74 BPM | SYSTOLIC BLOOD PRESSURE: 107 MMHG | DIASTOLIC BLOOD PRESSURE: 74 MMHG | TEMPERATURE: 98.1 F | HEIGHT: 63 IN

## 2019-04-24 LAB
ALBUMIN SERPL ELPH-MCNC: 4.3 G/DL — SIGNIFICANT CHANGE UP (ref 3.3–5)
ALP SERPL-CCNC: 64 U/L — SIGNIFICANT CHANGE UP (ref 40–120)
ALT FLD-CCNC: 29 U/L — SIGNIFICANT CHANGE UP (ref 4–33)
ANION GAP SERPL CALC-SCNC: 13 MMO/L — SIGNIFICANT CHANGE UP (ref 7–14)
AST SERPL-CCNC: 28 U/L — SIGNIFICANT CHANGE UP (ref 4–32)
BASOPHILS # BLD AUTO: 0.02 K/UL — SIGNIFICANT CHANGE UP (ref 0–0.2)
BASOPHILS NFR BLD AUTO: 0.3 % — SIGNIFICANT CHANGE UP (ref 0–2)
BILIRUB SERPL-MCNC: 0.2 MG/DL — SIGNIFICANT CHANGE UP (ref 0.2–1.2)
BUN SERPL-MCNC: 11 MG/DL — SIGNIFICANT CHANGE UP (ref 7–23)
CALCIUM SERPL-MCNC: 9.9 MG/DL — SIGNIFICANT CHANGE UP (ref 8.4–10.5)
CHLORIDE SERPL-SCNC: 103 MMOL/L — SIGNIFICANT CHANGE UP (ref 98–107)
CO2 SERPL-SCNC: 26 MMOL/L — SIGNIFICANT CHANGE UP (ref 22–31)
CREAT SERPL-MCNC: 0.71 MG/DL — SIGNIFICANT CHANGE UP (ref 0.5–1.3)
EOSINOPHIL # BLD AUTO: 0.14 K/UL — SIGNIFICANT CHANGE UP (ref 0–0.5)
EOSINOPHIL NFR BLD AUTO: 2.3 % — SIGNIFICANT CHANGE UP (ref 0–6)
GLUCOSE SERPL-MCNC: 81 MG/DL — SIGNIFICANT CHANGE UP (ref 70–99)
HCT VFR BLD CALC: 39.2 % — SIGNIFICANT CHANGE UP (ref 34.5–45)
HGB BLD-MCNC: 12.2 G/DL — SIGNIFICANT CHANGE UP (ref 11.5–15.5)
IMM GRANULOCYTES NFR BLD AUTO: 0.3 % — SIGNIFICANT CHANGE UP (ref 0–1.5)
LYMPHOCYTES # BLD AUTO: 1.59 K/UL — SIGNIFICANT CHANGE UP (ref 1–3.3)
LYMPHOCYTES # BLD AUTO: 26.1 % — SIGNIFICANT CHANGE UP (ref 13–44)
MCHC RBC-ENTMCNC: 29.9 PG — SIGNIFICANT CHANGE UP (ref 27–34)
MCHC RBC-ENTMCNC: 31.1 % — LOW (ref 32–36)
MCV RBC AUTO: 96.1 FL — SIGNIFICANT CHANGE UP (ref 80–100)
MONOCYTES # BLD AUTO: 0.58 K/UL — SIGNIFICANT CHANGE UP (ref 0–0.9)
MONOCYTES NFR BLD AUTO: 9.5 % — SIGNIFICANT CHANGE UP (ref 2–14)
NEUTROPHILS # BLD AUTO: 3.75 K/UL — SIGNIFICANT CHANGE UP (ref 1.8–7.4)
NEUTROPHILS NFR BLD AUTO: 61.5 % — SIGNIFICANT CHANGE UP (ref 43–77)
NRBC # FLD: 0 K/UL — SIGNIFICANT CHANGE UP (ref 0–0)
PLATELET # BLD AUTO: 295 K/UL — SIGNIFICANT CHANGE UP (ref 150–400)
PMV BLD: 9.2 FL — SIGNIFICANT CHANGE UP (ref 7–13)
POTASSIUM SERPL-MCNC: 4.1 MMOL/L — SIGNIFICANT CHANGE UP (ref 3.5–5.3)
POTASSIUM SERPL-SCNC: 4.1 MMOL/L — SIGNIFICANT CHANGE UP (ref 3.5–5.3)
PROT SERPL-MCNC: 7.6 G/DL — SIGNIFICANT CHANGE UP (ref 6–8.3)
RBC # BLD: 4.08 M/UL — SIGNIFICANT CHANGE UP (ref 3.8–5.2)
RBC # FLD: 11.9 % — SIGNIFICANT CHANGE UP (ref 10.3–14.5)
SODIUM SERPL-SCNC: 142 MMOL/L — SIGNIFICANT CHANGE UP (ref 135–145)
WBC # BLD: 6.1 K/UL — SIGNIFICANT CHANGE UP (ref 3.8–10.5)
WBC # FLD AUTO: 6.1 K/UL — SIGNIFICANT CHANGE UP (ref 3.8–10.5)

## 2019-04-24 PROCEDURE — 76641 ULTRASOUND BREAST COMPLETE: CPT | Mod: 26,LT

## 2019-04-24 PROCEDURE — 99218: CPT

## 2019-04-24 PROCEDURE — 99214 OFFICE O/P EST MOD 30 MIN: CPT

## 2019-04-24 PROCEDURE — 76642 ULTRASOUND BREAST LIMITED: CPT | Mod: 26,LT

## 2019-04-24 RX ORDER — ACETAMINOPHEN 500 MG
975 TABLET ORAL ONCE
Qty: 0 | Refills: 0 | Status: COMPLETED | OUTPATIENT
Start: 2019-04-24 | End: 2019-04-24

## 2019-04-24 RX ORDER — FERROUS SULFATE 325(65) MG
325 (65 FE) TABLET ORAL TWICE DAILY
Qty: 30 | Refills: 0 | Status: DISCONTINUED | COMMUNITY
Start: 2018-12-18 | End: 2019-04-24

## 2019-04-24 RX ORDER — SODIUM CHLORIDE 9 MG/ML
1000 INJECTION INTRAMUSCULAR; INTRAVENOUS; SUBCUTANEOUS ONCE
Qty: 0 | Refills: 0 | Status: COMPLETED | OUTPATIENT
Start: 2019-04-24 | End: 2019-04-24

## 2019-04-24 RX ADMIN — SODIUM CHLORIDE 2000 MILLILITER(S): 9 INJECTION INTRAMUSCULAR; INTRAVENOUS; SUBCUTANEOUS at 15:44

## 2019-04-24 RX ADMIN — Medication 100 MILLIGRAM(S): at 16:00

## 2019-04-24 RX ADMIN — Medication 975 MILLIGRAM(S): at 16:00

## 2019-04-24 NOTE — ED ADULT NURSE NOTE - OBJECTIVE STATEMENT
pt states at post delivery exam in doctors office they found a lump in her lt breast. s/p biopsy pt developed pain , red ness , swelling and fevers. area on side of lt breast red and swollen. sl placed labs sent. pt denies nursing. medicated as ordered.

## 2019-04-24 NOTE — PHYSICAL EXAM
[Awake] : awake [Alert] : alert [Acute Distress] : no acute distress [Mass] : breast mass [Tender] : tenderness [Nipple Discharge] : nipple discharge [Axillary LAD] : no axillary lymphadenopathy

## 2019-04-24 NOTE — CONSULT NOTE ADULT - SUBJECTIVE AND OBJECTIVE BOX
HPI:  34 yo P1 presents post vaginal delivery 2/20/19, and routine post-partum visit on 4/3/19; stopped breastfeeding at 2 weeks and reports diagnosis of mastitis at that time with treatment; a 5cm palpable left breast mass was diagnosed at post-partum visit on 4/3 (pt was unaware of mass and was asymptomatic), and pt was send for imaging: imaging not completed until 4/18/19 with report impression: 1:00 left breast site of palpable concern a group of heterogeneous masses spanning 4.3cm area are identified. Ultrasound biopsy recommended, BIRAD 4A; biopsy completed 4/22/19 with pathology report on chart stating- florid acute and chronic mastitis with microabscess; pt presents today, after visit with radiologist this morning due to pt's c/o fever and chills, for treatment of mastitis; pt states last took extra-strength Tylenol about 6-7 hours ago, and left breast is uncomfortable, but not overly tender at this time. Denies chills at this time.     OB/GYN HISTORY:   PAST MEDICAL & SURGICAL HISTORY:  No significant past medical history    Allergies    penicillin (Rash)    Intolerances      MEDICATIONS  (STANDING):    MEDICATIONS  (PRN):    FAMILY HISTORY:    SOCIAL HISTORY:    Name of GYN Physician:  Date of Last Pap:  History of Abnormal Pap:  Date of Last Mammogram:  Date of Last Colonoscopy:       Vital Signs Last 24 Hrs  T(C): 37.2 (24 Apr 2019 13:11), Max: 37.2 (24 Apr 2019 13:11)  T(F): 98.9 (24 Apr 2019 13:11), Max: 98.9 (24 Apr 2019 13:11)  HR: 93 (24 Apr 2019 13:11) (93 - 93)  BP: 112/68 (24 Apr 2019 13:11) (112/68 - 112/68)  BP(mean): --  RR: 16 (24 Apr 2019 13:11) (16 - 16)  SpO2: 100% (24 Apr 2019 13:11) (100% - 100%)    PHYSICAL EXAM:      Constitutional: alert and oriented x 3    Breasts: no tenderness, no nodules    Respiratory: clear    Cardiovascular: regular rate and rhythm    Gastrointestinal: soft, non tender, + bowel sounds. No hepatosplenomegaly, no palpable masses    Genitourinary: NEFG  Cervix: closed/ long, no CMT  Uterus: normal size, non tender  Adnexa: non tender, no palpable masses        LABS:                        12.2   6.10  )-----------( 295      ( 24 Apr 2019 15:40 )             39.2                     RADIOLOGY & ADDITIONAL STUDIES:    < from: US Breast Complete, Left (04.24.19 @ 16:36) >    CLINICAL INFORMATION: Mastitis. Recent ultrasound guided needle biopsy of   a left breast mass at 1:00, 1 cm from the nipple.    Comparison: Left breast ultrasound performed 4/22/2019 and 4/18/2019.    TECHNIQUE: Limited sonographic evaluation of the LEFT breast was   performed targeted to the area of pain in the left upper outer breast.   Evaluation was performed by a technologist. This study was performed   exclusively for the purpose of excluding the presence of abscess in the   clinical setting of mastitis.    FINDINGS:     Redemonstration of a heterogeneous mass within the area of concern not   significantly changed compared to the prior exam 4/22/2019. No new fluid   collection is identified.    IMPRESSION:     No sonographic evidence of breast abscess.    Clinical management of mastitis and follow-up results of recent biopsy of   the area of concern on 4/22/2019 is recommended.    Additional imaging in a dedicated breast imaging center should be   performed to exclude the possibility of malignancy as clinically   indicated. HPI:  32 yo P1 s/p vaginal delivery 19, and routine post-partum visit on 4/3/19; stopped breastfeeding at 2 weeks and reports diagnosis of mastitis at that time with treatment; a 5cm palpable left breast mass was diagnosed at post-partum visit on 4/3 (pt was unaware of mass and was asymptomatic), and pt was send for imaging: imaging not completed until 19 with report impression: 1:00 left breast site of palpable concern a group of heterogeneous masses spanning 4.3cm area are identified. Ultrasound biopsy recommended, BIRAD 4A; biopsy completed 19 with pathology report on chart stating- florid acute and chronic mastitis with microabscess; pt presents today, after visit with radiologist this morning due to pt's c/o fever and chills, for treatment of mastitis; pt states last took extra-strength Tylenol about 6-7 hours ago, and left breast is uncomfortable, but not overly tender at this time. Reports pain this morning. Denies chills at this time.     OB/GYN HISTORY:   PAST MEDICAL & SURGICAL HISTORY:  No significant past medical history    Allergies    penicillin (Rash)    Intolerances         Vital Signs Last 24 Hrs  T(C): 37.2 (2019 13:11), Max: 37.2 (2019 13:11)  T(F): 98.9 (2019 13:11), Max: 98.9 (2019 13:11)  HR: 93 (2019 13:11) (93 - 93)  BP: 112/68 (2019 13:11) (112/68 - 112/68)  BP(mean): --  RR: 16 (2019 13:11) (16 - 16)  SpO2: 100% (2019 13:11) (100% - 100%)    PHYSICAL EXAM:      Constitutional: alert and oriented x 3    Breasts: L breast mass, fluctuant, around 2 o clock, erythema extending superiorly and laterally, no axillary LA, no nipple discharge     Respiratory: clear    Cardiovascular: regular rate and rhythm    Gastrointestinal: soft, non tender, + bowel sounds. No hepatosplenomegaly, no palpable masses          LABS:                        12.2   6.10  )-----------( 295      ( 2019 15:40 )             39.2                     RADIOLOGY & ADDITIONAL STUDIES:    < from: US Breast Complete, Left (19 @ 16:36) >    CLINICAL INFORMATION: Mastitis. Recent ultrasound guided needle biopsy of   a left breast mass at 1:00, 1 cm from the nipple.    Comparison: Left breast ultrasound performed 2019 and 2019.    TECHNIQUE: Limited sonographic evaluation of the LEFT breast was   performed targeted to the area of pain in the left upper outer breast.   Evaluation was performed by a technologist. This study was performed   exclusively for the purpose of excluding the presence of abscess in the   clinical setting of mastitis.    FINDINGS:     Redemonstration of a heterogeneous mass within the area of concern not   significantly changed compared to the prior exam 2019. No new fluid   collection is identified.    IMPRESSION:     No sonographic evidence of breast abscess.    Clinical management of mastitis and follow-up results of recent biopsy of   the area of concern on 2019 is recommended.    Additional imaging in a dedicated breast imaging center should be   performed to exclude the possibility of malignancy as clinically   indicated. HPI:  32 yo P1 s/p vaginal delivery 19, and routine post-partum visit on 4/3/19; stopped breastfeeding at 2 weeks and reports diagnosis of mastitis at that time with no treatment; a 5cm palpable left breast mass was diagnosed at post-partum visit on 4/3 (pt was unaware of mass and was asymptomatic), and pt was send for imaging: imaging not completed until 19 with report impression: 1:00 left breast site of palpable concern a group of heterogeneous masses spanning 4.3cm area are identified. Ultrasound biopsy recommended, BIRAD 4A; biopsy completed 19 with pathology report on chart stating- florid acute and chronic mastitis with microabscess; pt presents today, after visit with radiologist this morning due to pt's c/o fever and chills, for treatment of mastitis; pt states last took extra-strength Tylenol about 6-7 hours ago, and left breast is uncomfortable, but not overly tender at this time. Reports pain this morning. Denies chills at this time.     OB/GYN HISTORY:   PAST MEDICAL & SURGICAL HISTORY:  No significant past medical history    Allergies    penicillin (Rash)    Intolerances         Vital Signs Last 24 Hrs  T(C): 37.2 (2019 13:11), Max: 37.2 (2019 13:11)  T(F): 98.9 (2019 13:11), Max: 98.9 (2019 13:11)  HR: 93 (2019 13:11) (93 - 93)  BP: 112/68 (2019 13:11) (112/68 - 112/68)  BP(mean): --  RR: 16 (2019 13:11) (16 - 16)  SpO2: 100% (2019 13:11) (100% - 100%)    PHYSICAL EXAM:      Constitutional: alert and oriented x 3    Breasts: L breast mass, fluctuant, around 2 o clock, erythema extending superiorly and laterally, no axillary LA, no nipple discharge     Respiratory: clear    Cardiovascular: regular rate and rhythm    Gastrointestinal: soft, non tender, + bowel sounds. No hepatosplenomegaly, no palpable masses          LABS:                        12.2   6.10  )-----------( 295      ( 2019 15:40 )             39.2                     RADIOLOGY & ADDITIONAL STUDIES:    < from: US Breast Complete, Left (19 @ 16:36) >    CLINICAL INFORMATION: Mastitis. Recent ultrasound guided needle biopsy of   a left breast mass at 1:00, 1 cm from the nipple.    Comparison: Left breast ultrasound performed 2019 and 2019.    TECHNIQUE: Limited sonographic evaluation of the LEFT breast was   performed targeted to the area of pain in the left upper outer breast.   Evaluation was performed by a technologist. This study was performed   exclusively for the purpose of excluding the presence of abscess in the   clinical setting of mastitis.    FINDINGS:     Redemonstration of a heterogeneous mass within the area of concern not   significantly changed compared to the prior exam 2019. No new fluid   collection is identified.    IMPRESSION:     No sonographic evidence of breast abscess.    Clinical management of mastitis and follow-up results of recent biopsy of   the area of concern on 2019 is recommended.    Additional imaging in a dedicated breast imaging center should be   performed to exclude the possibility of malignancy as clinically   indicated.

## 2019-04-24 NOTE — ED PROVIDER NOTE - CLINICAL SUMMARY MEDICAL DECISION MAKING FREE TEXT BOX
Shane PGY1- healthy 33 yoF, L breast redness, pain, fluctuance s/p bx 2 days ago, mass first felt 2 weeks ago, fever/chils since procedure for past 2 days, worsening sx, sent by GYN today for further eval  L breast mass, 2 oclock, fluctuant, erythematous, no axilliary LA, no drainage from nipple/skin, lungs cta, heart rrr  labs, upreg, ivf, iv abx, us, gyn consult  clinically mastitis, likely admit vs CDU, pending presence of abscess

## 2019-04-24 NOTE — ED PROVIDER NOTE - NEURO NEGATIVE STATEMENT, MLM
Educated pt on consistent carbohydrate diet, portion sizing including benefit of mixed meals, "my plate" model, label reading and strategies to promote improved glucose control. Recommended pt to increase consumption of whole grains, consume protein and fiber with CHO, avoid concentrated sweets. Discussed healthier snack and meal ideas, carbohydrate counting methods, and reviewed sample meal. Discussed heart healthy diet; identified foods high in sodium and fats, limit sodium intake, increased consumption of lean meats, fruit and vegetables, healthy fats and oils, cooking tips, and healthy snacking options. Recommended pt to start a food log and start monitoring fingersticks more often for better glycemic control. no loss of consciousness, no gait abnormality, no headache, no sensory deficits, and no weakness. fair plus

## 2019-04-24 NOTE — ED PROVIDER NOTE - OBJECTIVE STATEMENT
33 yoF, otherwise healthy, gave birth about 8 weeks ago presents to ED with report of L sided breast/redness. Stopped breastfeeding about 1 month ago. At her 6 week check up 2 weeks ago noticed to have 5 cm L breast lump. Now is 2 days s/p needle biopsy of the area. Since then has developed fever/chills, and worsening pain and erythema. No drainage. Took Tylenol. No abx. Sent from GYN office today for further eval. No cough, vomiting, diarrhea or abdominal pain.

## 2019-04-24 NOTE — ED CDU PROVIDER INITIAL DAY NOTE - OBJECTIVE STATEMENT
33 yoF, otherwise healthy, gave birth about 8 weeks ago presents to ED with report of L sided breast/redness. Stopped breastfeeding about 1 month ago. At her 6 week check up 2 weeks ago noticed to have 5 cm L breast lump. Now is 2 days s/p needle biopsy of the area. Since then has developed fever/chills, and worsening pain and erythema. No drainage. Took Tylenol. No abx. Sent from GYN office today for further eval. No cough, vomiting, diarrhea or abdominal pain.    CDU Note: Agree with above. 32 y/o female c/o left breast mastitis s/p  breast biopsy 2 days ago - in ED has US showing no fluid collection, seen by OBGYN who recommended IV clindamycin and will reassess am. Pt. feeling well without complaints.

## 2019-04-24 NOTE — ED CDU PROVIDER INITIAL DAY NOTE - DETAILS
32 y/o female w/ left breast mastitis   -obgyn consult  -iv clindmaycin  -pain control  -obgyn reassess am

## 2019-04-24 NOTE — ED PROVIDER NOTE - ATTENDING CONTRIBUTION TO CARE
DR. MAADOR, ATTENDING MD-  I performed a face to face bedside interview with patient regarding history of present illness, review of symptoms and past medical history. I completed an independent physical exam.  I have discussed patient's plan of care with the resident.   Documentation as above in the note.    32 y/o female sent by gyn for mastitis.  Pt states she delivered in February, only  for 2 wks before switching to formula.  Noted to have left breast mass at f/u visit and had recent bx two days prior.  Now with worsening redness and pain over left breast.  Denies f/c, ha, neck stiffness, cp, sob, cough, abd pain, n/v/d, dysuria.  Afebrile, vs wnl, nad, ctabil, s1s2 rrr no m/r/g, abd soft non ttp no r/g, no cva tenderness b/l, no leg swelling b/l, left breast erythematous indurated at lat aspect without palp underlying fluctuance.  Mastitis, eval for underlying abscess, likely secondary to recent instrumentation.  Obtain cbc, bmp, u/s breast, give abx, consult ob/gyn, likely obs.

## 2019-04-24 NOTE — CONSULT NOTE ADULT - ASSESSMENT
34 yo P1 s/p  19 w/ breast mastitis worsened after breast biopsy     -IV abx (Clindamycin)  -Monitor vital signs  -Gyn will continue to monitor    MARY Girard, PGY3  D/W Dr. Ambrosio

## 2019-04-24 NOTE — ED PROVIDER NOTE - PHYSICAL EXAMINATION
PHYSICAL EXAM:  GENERAL: NAD, well-groomed, well-developed  HEAD:  Atraumatic, Normocephalic  EYES: EOMI, PERRLA, conjunctiva and sclera clear  ENMT: No tonsillar erythema, exudates, or enlargement; Moist mucous membranes  NECK: Supple, No JVD, Normal thyroid  HEART: Regular rate and rhythm; No murmurs, rubs, or gallops  RESPIRATORY: CTA B/L, No W/R/R  BREAST: L breast mass, fluctuant, around 2 o clock, erythema extending superiorly and laterally, no axillary LA, no nipple discharge   ABDOMEN: Soft, Nontender, non distension  NEURO: A&Ox3, nonfocal, moving all extremities  EXTREMITIES:  2+ Peripheral Pulses, No clubbing, cyanosis, or edema  SKIN: warm, dry, normal color, L breast erythema

## 2019-04-24 NOTE — ED CDU PROVIDER INITIAL DAY NOTE - ATTENDING CONTRIBUTION TO CARE
Lornajose: 34 yo female approx 8 weeks pos partum sent in by GYN for mastitis/cellulitis of the left breast. Pt had biopsy of left breast for mass found on routine post partum exam. Biopsy showed mastitis an don Tuesday morning she developed worsening pain at the site, swelling, erythema, fevers and chills. Today she was seen by GYN and sent to the ED for IV abx. ED workup unremarkable- no signs of abscess on US. CDU Plan for IV abx, GYN to follow and observation fro clinical improvement. Exam: GENERAL: well appearing, NAD, HEENT: MMM,  CHEST: + left lateral breast erythema, induration and TTP. + warmth to touch. no nipple discharge. chaperoned by ALE Silverman CARDIO: +S1/S2, no murmurs, rubs or gallops, LUNGS: CTA B/L, no wheezing, rales or rhonchi, ABD: soft, nontender, BSx4 quadrants, no guarding or rigidity. NEURO: AxOx3, SKIN: breast exam as noted above A/P- 34 yo female with left breast mastitis/cellulitis. will give abx, GYN to follow and observe.

## 2019-04-24 NOTE — ED CDU PROVIDER INITIAL DAY NOTE - PHYSICAL EXAMINATION
Gen: Well appearing in NAD  Head: NC/AT  Neck: trachea midline  Resp:  No distress  Ext: no deformities  Neuro:  A&O appears non focal  Skin:  Warm and dry as visualized  Psych:  Normal affect and mood     Left breast: Gen: Well appearing in NAD  Head: NC/AT  Neck: trachea midline  Resp:  No distress  Ext: no deformities  Neuro:  A&O appears non focal  Skin:  Warm and dry as visualized  Psych:  Normal affect and mood     Left breast: + left lateral breast erythema, induration and TTP. + warmth to touch. no nipple discharge.

## 2019-04-24 NOTE — ED ADULT NURSE NOTE - NSIMPLEMENTINTERV_GEN_ALL_ED
Implemented All Universal Safety Interventions:  [place] to call system. Call bell, personal items and telephone within reach. Instruct patient to call for assistance. Room bathroom lighting operational. Non-slip footwear when patient is off stretcher. Physically safe environment: no spills, clutter or unnecessary equipment. Stretcher in lowest position, wheels locked, appropriate side rails in place.

## 2019-04-24 NOTE — ED PROVIDER NOTE - PROGRESS NOTE DETAILS
Shane PGY1- d/w with GYN resident, advised that pt belongs to Dr. William and in ED, they will eval Shane PGY1- no abscess on US, will monitor in CDU for iv abx Haverty PGY1- gyn aware of CDU stay, they are aware and will let Nataliia know

## 2019-04-24 NOTE — ED CDU PROVIDER INITIAL DAY NOTE - MEDICAL DECISION MAKING DETAILS
34 y/o female w/ left breast mastitis   -obgyn consult  -iv clindmaycin  -pain control  -obgyn reassess am

## 2019-04-25 ENCOUNTER — OTHER (OUTPATIENT)
Age: 34
End: 2019-04-25

## 2019-04-25 VITALS
OXYGEN SATURATION: 100 % | TEMPERATURE: 98 F | DIASTOLIC BLOOD PRESSURE: 53 MMHG | RESPIRATION RATE: 18 BRPM | SYSTOLIC BLOOD PRESSURE: 97 MMHG | HEART RATE: 66 BPM

## 2019-04-25 LAB — HBA1C BLD-MCNC: 5 % — SIGNIFICANT CHANGE UP (ref 4–5.6)

## 2019-04-25 PROCEDURE — 99217: CPT

## 2019-04-25 RX ADMIN — Medication 100 MILLIGRAM(S): at 00:06

## 2019-04-25 RX ADMIN — Medication 100 MILLIGRAM(S): at 07:18

## 2019-04-25 NOTE — ED CDU PROVIDER SUBSEQUENT DAY NOTE - HISTORY
33 yoF, otherwise healthy, gave birth about 8 weeks ago presents to ED with report of L sided breast/redness. Stopped breastfeeding about 1 month ago. At her 6 week check up 2 weeks ago noticed to have 5 cm L breast lump. Now is 2 days s/p needle biopsy of the area. Since then has developed fever/chills, and worsening pain and erythema. No drainage. Took Tylenol. No abx. Sent from GYN office today for further eval. No cough, vomiting, diarrhea or abdominal pain.    CDU Note: Agree with above. 34 y/o female c/o left breast mastitis s/p  breast biopsy 2 days ago - in ED has US showing no fluid collection, seen by OBGYN who recommended IV clindamycin and will reassess am. Pt. feeling well without complaints.    CDU Subsequent day ALE Palafox: Agree with above, 33 yoF, otherwise healthy, gave birth about 8 weeks ago presents to ED with report of L sided breast/redness. Stopped breastfeeding about 1 month ago. At her 6 week check up 2 weeks ago noticed to have 5 cm L breast lump. Now is 2 days s/p needle biopsy of the area. Since then has developed fever/chills, and worsening pain and erythema. No drainage. Took Tylenol. No abx. Sent from GYN office today for further eval. No cough, vomiting, diarrhea or abdominal pain.    CDU Note: Agree with above. 34 y/o female c/o left breast mastitis s/p  breast biopsy 2 days ago - in ED has US showing no fluid collection, seen by OBGYN who recommended IV clindamycin and will reassess am. Pt. feeling well without complaints.    CDU Subsequent day ALE Palafox: Agree with above, 32 Y/O F noted a breast lump which was biopsied. After the procedure pt began to have redness and swelling at the biopsy site. Pt with a local cellulitis of the breast in ED and was sent to CDU for IV ABX. Pt reports improvement of area, no fever chills nightsweats or any other symptoms.

## 2019-04-25 NOTE — ED CDU PROVIDER DISPOSITION NOTE - ATTENDING CONTRIBUTION TO CARE
Dr. Coulter: I performed a face to face bedside interview with patient regarding history of present illness, review of symptoms and past medical history. I completed an independent physical exam.  I have discussed patient's plan of care with PA.   I agree with note as stated above, having amended the EMR as needed to reflect my findings.   This includes HISTORY OF PRESENT ILLNESS, HIV, PAST MEDICAL/SURGICAL/FAMILY/SOCIAL HISTORY, ALLERGIES AND HOME MEDICATIONS, REVIEW OF SYSTEMS, PHYSICAL EXAM, and any PROGRESS NOTES during the time I functioned as the attending physician for this patient.

## 2019-04-25 NOTE — ED CDU PROVIDER DISPOSITION NOTE - CLINICAL COURSE
33F with  L breast redness s/p biopsy. afebrile. s/p negative breast US for abscess. Evaluated by GYN and placed in CDU for IV abx and observation. s/p IV clindamycin. This morning erythema receded  but with area of induration and also oozing from puncture site. GYN drained the hematoma at bedside. No longer bleeding. Pt ws discharged on Clindamycin and is to follow up with GYN.

## 2019-04-25 NOTE — ED CDU PROVIDER SUBSEQUENT DAY NOTE - PROGRESS NOTE DETAILS
Received sign out from ALE Yuen. pt examined by cdu attng and I. pt feeling better, no fever or chills. redness improved. exam chaperone Dr. Coulter. +erythema well demarcated, no crepitus, no streaking, no swelling. +area of induration. paged obgyn, induration on exam because of hematoma. obgyn drained hematoma at bedside. suggesting dc home with clindamycin. pt seen by private obgyn and house obgyn.

## 2019-04-25 NOTE — PROGRESS NOTE ADULT - SUBJECTIVE AND OBJECTIVE BOX
Patient seen and examined at bedside in CDU no acute overnight events. No acute complaints, breast pain well controlled.  Patient is ambulating and tolerating regular diet. Denies CP, SOB, N/V, fevers, and chills.    Vital Signs Last 24 Hours  T(C): 36.4 (04-25-19 @ 05:00), Max: 37.2 (04-24-19 @ 13:11)  HR: 71 (04-25-19 @ 05:00) (66 - 93)  BP: 97/58 (04-25-19 @ 05:00) (97/58 - 112/68)  RR: 18 (04-25-19 @ 05:00) (16 - 18)  SpO2: 99% (04-25-19 @ 05:00) (99% - 100%)        Physical Exam:  General: NAD  CV: NR, RR, S1, S2, no M/R/G  Breast:  L breast mass, fluctuant,erythema extending superiorly and laterally, borders decreasing in size, no axillary LA, no nipple discharge   Lungs: CTA-B  Abdomen: Soft, non-tender, non-distended, +BS  Ext: No pain or swelling    Labs:                        12.2   6.10  )-----------( 295      ( 24 Apr 2019 15:40 )             39.2   baso 0.3    eos 2.3    imm gran 0.3    lymph 26.1   mono 9.5    poly 61.5       MEDICATIONS  (STANDING):  clindamycin IVPB 600 milliGRAM(s) IV Intermittent every 8 hours

## 2019-04-25 NOTE — PROGRESS NOTE ADULT - ATTENDING COMMENTS
Pt seen and examined by me today. I agree with findings, assessment and plan documented in resident note. Left breast mass has improved (decreased in size). I examined her in office yesterday. Pt seen and examined by me today. I agree with findings, assessment and plan documented in resident note. Left breast mass has improved (decreased in size). I examined her in office yesterday. Will discharge pt home on oral clindamycin to complete the course. Pt to followup with Dr William early next week.

## 2019-04-25 NOTE — ED CDU PROVIDER SUBSEQUENT DAY NOTE - MEDICAL DECISION MAKING DETAILS
32 Y/O F noted a breast lump which was biopsied. After the procedure pt began to have redness and swelling at the biopsy site. Pt with a local breast cellulitis that is improving on IV ABX. Pt is stable at this time. No fever chills nightsweats or other systemic symptoms.

## 2019-04-25 NOTE — ED CDU PROVIDER SUBSEQUENT DAY NOTE - ATTENDING CONTRIBUTION TO CARE
Dr. Coulter: I performed a face to face bedside interview with patient regarding history of present illness, review of symptoms and past medical history. I completed an independent physical exam.  I have discussed patient's plan of care with PA.   I agree with note as stated above, having amended the EMR as needed to reflect my findings.   This includes HISTORY OF PRESENT ILLNESS, HIV, PAST MEDICAL/SURGICAL/FAMILY/SOCIAL HISTORY, ALLERGIES AND HOME MEDICATIONS, REVIEW OF SYSTEMS, PHYSICAL EXAM, and any PROGRESS NOTES during the time I functioned as the attending physician for this patient.    33F with  L breast redness s/p biopsy. afebrile. s/p negative breast US for abscess. Evaluated by GYN and placed in CDU for IV abx and observation. s/p IV clindamycin. This morning erythema receded  but with area of induration and also oozing from puncture site. GYN drained the hematoma at bedside. No longer bleeding. To be discharged on Clindamycin and f/u with GYN.

## 2019-04-25 NOTE — ED CDU PROVIDER DISPOSITION NOTE - NSFOLLOWUPINSTRUCTIONS_ED_ALL_ED_FT
Follow up with your OBGYN this upcoming Monday. Warm compresses. Take Clindamycin 300mg every 6 hours for next 9 days.    Return to ER for any new or worsening symptoms, fever, chills, increased redness, streaking (red lines), swelling, discharge or any other concerns.

## 2019-04-25 NOTE — PROGRESS NOTE ADULT - ASSESSMENT
34 yo P1 s/p  19 w/ left breast mastitis worsened after breast biopsy     -Continue Clindamycin, afebrile overnight  -Breast US negative for abscess    MARY Girard, PGY3  #[id number]

## 2019-04-30 ENCOUNTER — APPOINTMENT (OUTPATIENT)
Dept: OBGYN | Facility: CLINIC | Age: 34
End: 2019-04-30
Payer: COMMERCIAL

## 2019-04-30 VITALS
HEART RATE: 67 BPM | DIASTOLIC BLOOD PRESSURE: 74 MMHG | HEIGHT: 63 IN | BODY MASS INDEX: 28.17 KG/M2 | SYSTOLIC BLOOD PRESSURE: 117 MMHG | WEIGHT: 159 LBS

## 2019-04-30 DIAGNOSIS — N61.1 ABSCESS OF THE BREAST AND NIPPLE: ICD-10-CM

## 2019-04-30 DIAGNOSIS — Z34.01 ENCOUNTER FOR SUPERVISION OF NORMAL FIRST PREGNANCY, FIRST TRIMESTER: ICD-10-CM

## 2019-04-30 DIAGNOSIS — Z87.898 PERSONAL HISTORY OF OTHER SPECIFIED CONDITIONS: ICD-10-CM

## 2019-04-30 DIAGNOSIS — Z34.02 ENCOUNTER FOR SUPERVISION OF NORMAL FIRST PREGNANCY, SECOND TRIMESTER: ICD-10-CM

## 2019-04-30 DIAGNOSIS — Z34.03 ENCOUNTER FOR SUPERVISION OF NORMAL FIRST PREGNANCY, THIRD TRIMESTER: ICD-10-CM

## 2019-04-30 DIAGNOSIS — Z34.92 ENCOUNTER FOR SUPERVISION OF NORMAL PREGNANCY, UNSPECIFIED, SECOND TRIMESTER: ICD-10-CM

## 2019-04-30 DIAGNOSIS — O26.893 OTHER SPECIFIED PREGNANCY RELATED CONDITIONS, THIRD TRIMESTER: ICD-10-CM

## 2019-04-30 DIAGNOSIS — R06.00 OTHER SPECIFIED PREGNANCY RELATED CONDITIONS, THIRD TRIMESTER: ICD-10-CM

## 2019-04-30 PROCEDURE — 99213 OFFICE O/P EST LOW 20 MIN: CPT

## 2019-05-11 PROBLEM — Z87.898 HISTORY OF MASTITIS: Status: RESOLVED | Noted: 2019-05-11 | Resolved: 2019-05-11

## 2019-05-11 PROBLEM — O26.893: Status: RESOLVED | Noted: 2019-01-03 | Resolved: 2019-05-11

## 2019-05-11 PROBLEM — Z34.01 ENCOUNTER FOR PRENATAL CARE IN FIRST TRIMESTER OF FIRST PREGNANCY: Status: RESOLVED | Noted: 2018-08-21 | Resolved: 2019-05-11

## 2019-05-11 PROBLEM — Z34.02 ENCOUNTER FOR SUPERVISION OF NORMAL FIRST PREGNANCY IN SECOND TRIMESTER: Status: RESOLVED | Noted: 2018-10-30 | Resolved: 2019-05-11

## 2019-05-11 PROBLEM — Z34.02 ENCOUNTER FOR PRENATAL CARE OF FIRST PREGNANCY, SECOND TRIMESTER: Status: RESOLVED | Noted: 2018-11-29 | Resolved: 2019-05-11

## 2019-05-11 PROBLEM — Z34.92 ENCOUNTER FOR PREGNANCY RELATED EXAMINATION IN SECOND TRIMESTER: Status: RESOLVED | Noted: 2018-10-01 | Resolved: 2019-05-11

## 2019-05-11 PROBLEM — Z34.03 ENCOUNTER FOR PRENATAL CARE IN THIRD TRIMESTER OF FIRST PREGNANCY: Status: RESOLVED | Noted: 2018-12-18 | Resolved: 2019-05-11

## 2019-05-11 NOTE — PHYSICAL EXAM
[Mass] : breast mass [Nipple Discharge] : no nipple discharge [Axillary LAD] : no axillary lymphadenopathy [Soft] : soft [Tender] : non tender [Distended] : not distended

## 2019-07-01 ENCOUNTER — TRANSCRIPTION ENCOUNTER (OUTPATIENT)
Age: 34
End: 2019-07-01

## 2019-07-01 ENCOUNTER — INPATIENT (INPATIENT)
Facility: HOSPITAL | Age: 34
LOS: 1 days | Discharge: HOME | End: 2019-07-03
Attending: OBSTETRICS & GYNECOLOGY | Admitting: OBSTETRICS & GYNECOLOGY
Payer: COMMERCIAL

## 2019-07-01 VITALS
OXYGEN SATURATION: 100 % | TEMPERATURE: 100 F | SYSTOLIC BLOOD PRESSURE: 133 MMHG | HEART RATE: 100 BPM | DIASTOLIC BLOOD PRESSURE: 82 MMHG | RESPIRATION RATE: 16 BRPM

## 2019-07-01 LAB
ALBUMIN SERPL ELPH-MCNC: 4.1 G/DL — SIGNIFICANT CHANGE UP (ref 3.3–5)
ALP SERPL-CCNC: 54 U/L — SIGNIFICANT CHANGE UP (ref 40–120)
ALT FLD-CCNC: 14 U/L — SIGNIFICANT CHANGE UP (ref 4–33)
ANION GAP SERPL CALC-SCNC: 13 MMO/L — SIGNIFICANT CHANGE UP (ref 7–14)
APTT BLD: 28.5 SEC — SIGNIFICANT CHANGE UP (ref 27.5–36.3)
AST SERPL-CCNC: 14 U/L — SIGNIFICANT CHANGE UP (ref 4–32)
BASE EXCESS BLDV CALC-SCNC: 0.8 MMOL/L — SIGNIFICANT CHANGE UP
BASOPHILS # BLD AUTO: 0.02 K/UL — SIGNIFICANT CHANGE UP (ref 0–0.2)
BASOPHILS NFR BLD AUTO: 0.3 % — SIGNIFICANT CHANGE UP (ref 0–2)
BILIRUB SERPL-MCNC: 0.3 MG/DL — SIGNIFICANT CHANGE UP (ref 0.2–1.2)
BLOOD GAS VENOUS - CREATININE: 0.71 MG/DL — SIGNIFICANT CHANGE UP (ref 0.5–1.3)
BUN SERPL-MCNC: 7 MG/DL — SIGNIFICANT CHANGE UP (ref 7–23)
CALCIUM SERPL-MCNC: 9.4 MG/DL — SIGNIFICANT CHANGE UP (ref 8.4–10.5)
CHLORIDE BLDV-SCNC: 110 MMOL/L — HIGH (ref 96–108)
CHLORIDE SERPL-SCNC: 104 MMOL/L — SIGNIFICANT CHANGE UP (ref 98–107)
CO2 SERPL-SCNC: 23 MMOL/L — SIGNIFICANT CHANGE UP (ref 22–31)
CREAT SERPL-MCNC: 0.69 MG/DL — SIGNIFICANT CHANGE UP (ref 0.5–1.3)
EOSINOPHIL # BLD AUTO: 0.02 K/UL — SIGNIFICANT CHANGE UP (ref 0–0.5)
EOSINOPHIL NFR BLD AUTO: 0.3 % — SIGNIFICANT CHANGE UP (ref 0–6)
GAS PNL BLDV: 135 MMOL/L — LOW (ref 136–146)
GLUCOSE BLDV-MCNC: 98 MG/DL — SIGNIFICANT CHANGE UP (ref 70–99)
GLUCOSE SERPL-MCNC: 105 MG/DL — HIGH (ref 70–99)
HCO3 BLDV-SCNC: 24 MMOL/L — SIGNIFICANT CHANGE UP (ref 20–27)
HCT VFR BLD CALC: 37.9 % — SIGNIFICANT CHANGE UP (ref 34.5–45)
HCT VFR BLDV CALC: 40.4 % — SIGNIFICANT CHANGE UP (ref 34.5–45)
HGB BLD-MCNC: 12.5 G/DL — SIGNIFICANT CHANGE UP (ref 11.5–15.5)
HGB BLDV-MCNC: 13.2 G/DL — SIGNIFICANT CHANGE UP (ref 11.5–15.5)
IMM GRANULOCYTES NFR BLD AUTO: 0.4 % — SIGNIFICANT CHANGE UP (ref 0–1.5)
INR BLD: 1.12 — SIGNIFICANT CHANGE UP (ref 0.88–1.17)
LACTATE BLDV-MCNC: 1.7 MMOL/L — SIGNIFICANT CHANGE UP (ref 0.5–2)
LYMPHOCYTES # BLD AUTO: 0.65 K/UL — LOW (ref 1–3.3)
LYMPHOCYTES # BLD AUTO: 9.1 % — LOW (ref 13–44)
MCHC RBC-ENTMCNC: 29.4 PG — SIGNIFICANT CHANGE UP (ref 27–34)
MCHC RBC-ENTMCNC: 33 % — SIGNIFICANT CHANGE UP (ref 32–36)
MCV RBC AUTO: 89.2 FL — SIGNIFICANT CHANGE UP (ref 80–100)
MONOCYTES # BLD AUTO: 0.64 K/UL — SIGNIFICANT CHANGE UP (ref 0–0.9)
MONOCYTES NFR BLD AUTO: 9 % — SIGNIFICANT CHANGE UP (ref 2–14)
NEUTROPHILS # BLD AUTO: 5.79 K/UL — SIGNIFICANT CHANGE UP (ref 1.8–7.4)
NEUTROPHILS NFR BLD AUTO: 80.9 % — HIGH (ref 43–77)
NRBC # FLD: 0 K/UL — SIGNIFICANT CHANGE UP (ref 0–0)
PCO2 BLDV: 43 MMHG — SIGNIFICANT CHANGE UP (ref 41–51)
PH BLDV: 7.39 PH — SIGNIFICANT CHANGE UP (ref 7.32–7.43)
PLATELET # BLD AUTO: 244 K/UL — SIGNIFICANT CHANGE UP (ref 150–400)
PMV BLD: 9.8 FL — SIGNIFICANT CHANGE UP (ref 7–13)
PO2 BLDV: 22 MMHG — LOW (ref 35–40)
POTASSIUM BLDV-SCNC: 3.2 MMOL/L — LOW (ref 3.4–4.5)
POTASSIUM SERPL-MCNC: 3.6 MMOL/L — SIGNIFICANT CHANGE UP (ref 3.5–5.3)
POTASSIUM SERPL-SCNC: 3.6 MMOL/L — SIGNIFICANT CHANGE UP (ref 3.5–5.3)
PROT SERPL-MCNC: 7 G/DL — SIGNIFICANT CHANGE UP (ref 6–8.3)
PROTHROM AB SERPL-ACNC: 12.5 SEC — SIGNIFICANT CHANGE UP (ref 9.8–13.1)
RBC # BLD: 4.25 M/UL — SIGNIFICANT CHANGE UP (ref 3.8–5.2)
RBC # FLD: 12.5 % — SIGNIFICANT CHANGE UP (ref 10.3–14.5)
SAO2 % BLDV: 31.8 % — LOW (ref 60–85)
SODIUM SERPL-SCNC: 140 MMOL/L — SIGNIFICANT CHANGE UP (ref 135–145)
WBC # BLD: 7.15 K/UL — SIGNIFICANT CHANGE UP (ref 3.8–10.5)
WBC # FLD AUTO: 7.15 K/UL — SIGNIFICANT CHANGE UP (ref 3.8–10.5)

## 2019-07-01 PROCEDURE — 76830 TRANSVAGINAL US NON-OB: CPT | Mod: 26

## 2019-07-01 RX ORDER — SODIUM CHLORIDE 9 MG/ML
2200 INJECTION, SOLUTION INTRAVENOUS ONCE
Refills: 0 | Status: COMPLETED | OUTPATIENT
Start: 2019-07-01 | End: 2019-07-01

## 2019-07-01 RX ORDER — GENTAMICIN SULFATE 40 MG/ML
100 VIAL (ML) INJECTION ONCE
Refills: 0 | Status: COMPLETED | OUTPATIENT
Start: 2019-07-01 | End: 2019-07-01

## 2019-07-01 RX ORDER — ACETAMINOPHEN 500 MG
650 TABLET ORAL ONCE
Refills: 0 | Status: COMPLETED | OUTPATIENT
Start: 2019-07-01 | End: 2019-07-01

## 2019-07-01 RX ADMIN — Medication 650 MILLIGRAM(S): at 22:53

## 2019-07-01 RX ADMIN — Medication 200 MILLIGRAM(S): at 23:30

## 2019-07-01 RX ADMIN — Medication 900 MILLIGRAM(S): at 23:25

## 2019-07-01 RX ADMIN — Medication 100 MILLIGRAM(S): at 22:55

## 2019-07-01 RX ADMIN — SODIUM CHLORIDE 2200 MILLILITER(S): 9 INJECTION, SOLUTION INTRAVENOUS at 23:31

## 2019-07-01 NOTE — ED PROVIDER NOTE - ATTENDING CONTRIBUTION TO CARE
Lobo: 33 yof with  2 days s/p D&C. Pt with myalgias, fever today. No cp, no sore throat, no ha, no sob, no cough, no abd pain, no nausea, no vomiting, 1 episode of dysuria today, some breast soreness. +discharge. On exam, pt is well appearing, no distress, clear lungs, normal cardiac, abd soft, febrile, tachy, bp stable, abd soft and no tn, pelvic as above, breast exam with no induration, no specific area of tn. no rash. Fever with discharge after D&C concern for endometritis but pt is non tender, r/o flu, uti, meets criteria for sepsis. GYn will consult after labs, fluids, antibxs, cultures sent.

## 2019-07-01 NOTE — ED ADULT TRIAGE NOTE - CHIEF COMPLAINT QUOTE
Pt 2 days S/P D&C, c/o Fever/Chills/Dysuria/Foul smelling urine x this am. Pt denies abd/pelvic pain. States took 1000mg Tylenol 2.5 hours ago. Pt 2 days S/P D&C, c/o Fever/Chills/Dysuria/Foul smelling urine x this am. Pt denies abd/pelvic pain. States took 1000mg Tylenol 2.5 hours ago. Pt upgraded to JAYCOB 2 for tachycardia and increase in temp

## 2019-07-01 NOTE — ED ADULT NURSE NOTE - OBJECTIVE STATEMENT
Report received from previous intake RN. Pt. received in intake room 5, Pt. was at ultrasound prior to labs being obtained. Pt. is A&Ox4, ambulatory. Pt. c/o fever that began yesterday and foul smelling vaginal discharge; reports she had D&C done 2 days ago for elective  at 7 weeks gestation. Reports going to urgent care today and told to come to ED. Denies n/v/d, dysuria, chest pain, SOB, dizziness. 20 gauge IV inserted in right ac, labs sent. ALE Brantley aware of pt. vital signs and labs sent. As per PA, no continuous pulse ox needed and vital sign order; take vital signs as per routine in ED. Respirations are even and unlabored on room air. Pt. placed on droplet precautions, RVP pending. Pt's  at bedside offered mask, refused. Awaiting lab results. Will continue to monitor.

## 2019-07-01 NOTE — ED ADULT NURSE NOTE - CHIEF COMPLAINT QUOTE
Pt 2 days S/P D&C, c/o Fever/Chills/Dysuria/Foul smelling urine x this am. Pt denies abd/pelvic pain. States took 1000mg Tylenol 2.5 hours ago. Pt upgraded to JAYCOB 2 for tachycardia and increase in temp

## 2019-07-01 NOTE — ED PROVIDER NOTE - OBJECTIVE STATEMENT
33 year old female  LMP with no known PMH presents to the ED complaining fevers at home since yesterday with associated foul smelling vaginal discharge. Pt states 33 year old female  LMP with no known PMH presents to the ED complaining fevers at home since yesterday with associated foul smelling vaginal discharge. Pt states she had a D&C done 2 days ago for an elective  at 7 weeks gestation , spiked a fever the following day, [facility] which she did but fevers persistent; she then went to an urgent care today and was referred to the ED. Fever at Tmax 100.5F. Pt denies any other complaints other that an episode of mastitis that she had a few months ago, but no breast pain at present.

## 2019-07-01 NOTE — ED PROVIDER NOTE - CLINICAL SUMMARY MEDICAL DECISION MAKING FREE TEXT BOX
33 year old female with fever and foul smelling discharge s/p D&C 2 days ago. On arrival, pt is febrile and tachycardic, meets SIRS criteria, concern for a post-procedural pelvic infection. Will initiate sepsis protocol and consult OB/GYN. Monitor and Reassess.

## 2019-07-02 DIAGNOSIS — N71.9 INFLAMMATORY DISEASE OF UTERUS, UNSPECIFIED: ICD-10-CM

## 2019-07-02 DIAGNOSIS — Z98.890 OTHER SPECIFIED POSTPROCEDURAL STATES: Chronic | ICD-10-CM

## 2019-07-02 DIAGNOSIS — R50.9 FEVER, UNSPECIFIED: ICD-10-CM

## 2019-07-02 LAB
APPEARANCE UR: CLEAR — SIGNIFICANT CHANGE UP
B PERT DNA SPEC QL NAA+PROBE: NOT DETECTED — SIGNIFICANT CHANGE UP
BACTERIA # UR AUTO: NEGATIVE — SIGNIFICANT CHANGE UP
BILIRUB UR-MCNC: NEGATIVE — SIGNIFICANT CHANGE UP
BLOOD UR QL VISUAL: HIGH
C PNEUM DNA SPEC QL NAA+PROBE: NOT DETECTED — SIGNIFICANT CHANGE UP
COLOR SPEC: COLORLESS — SIGNIFICANT CHANGE UP
FLUAV H1 2009 PAND RNA SPEC QL NAA+PROBE: NOT DETECTED — SIGNIFICANT CHANGE UP
FLUAV H1 RNA SPEC QL NAA+PROBE: NOT DETECTED — SIGNIFICANT CHANGE UP
FLUAV H3 RNA SPEC QL NAA+PROBE: NOT DETECTED — SIGNIFICANT CHANGE UP
FLUAV SUBTYP SPEC NAA+PROBE: NOT DETECTED — SIGNIFICANT CHANGE UP
FLUBV RNA SPEC QL NAA+PROBE: NOT DETECTED — SIGNIFICANT CHANGE UP
GLUCOSE UR-MCNC: NEGATIVE — SIGNIFICANT CHANGE UP
HADV DNA SPEC QL NAA+PROBE: NOT DETECTED — SIGNIFICANT CHANGE UP
HCOV PNL SPEC NAA+PROBE: SIGNIFICANT CHANGE UP
HCT VFR BLD CALC: 34.6 % — SIGNIFICANT CHANGE UP (ref 34.5–45)
HGB BLD-MCNC: 11.5 G/DL — SIGNIFICANT CHANGE UP (ref 11.5–15.5)
HMPV RNA SPEC QL NAA+PROBE: NOT DETECTED — SIGNIFICANT CHANGE UP
HPIV1 RNA SPEC QL NAA+PROBE: NOT DETECTED — SIGNIFICANT CHANGE UP
HPIV2 RNA SPEC QL NAA+PROBE: NOT DETECTED — SIGNIFICANT CHANGE UP
HPIV3 RNA SPEC QL NAA+PROBE: NOT DETECTED — SIGNIFICANT CHANGE UP
HPIV4 RNA SPEC QL NAA+PROBE: NOT DETECTED — SIGNIFICANT CHANGE UP
HYALINE CASTS # UR AUTO: NEGATIVE — SIGNIFICANT CHANGE UP
KETONES UR-MCNC: NEGATIVE — SIGNIFICANT CHANGE UP
LEUKOCYTE ESTERASE UR-ACNC: NEGATIVE — SIGNIFICANT CHANGE UP
MCHC RBC-ENTMCNC: 29.5 PG — SIGNIFICANT CHANGE UP (ref 27–34)
MCHC RBC-ENTMCNC: 33.2 % — SIGNIFICANT CHANGE UP (ref 32–36)
MCV RBC AUTO: 88.7 FL — SIGNIFICANT CHANGE UP (ref 80–100)
NITRITE UR-MCNC: NEGATIVE — SIGNIFICANT CHANGE UP
NRBC # FLD: 0 K/UL — SIGNIFICANT CHANGE UP (ref 0–0)
PH UR: 7 — SIGNIFICANT CHANGE UP (ref 5–8)
PLATELET # BLD AUTO: 222 K/UL — SIGNIFICANT CHANGE UP (ref 150–400)
PMV BLD: 10 FL — SIGNIFICANT CHANGE UP (ref 7–13)
PROT UR-MCNC: NEGATIVE — SIGNIFICANT CHANGE UP
RBC # BLD: 3.9 M/UL — SIGNIFICANT CHANGE UP (ref 3.8–5.2)
RBC # FLD: 12.6 % — SIGNIFICANT CHANGE UP (ref 10.3–14.5)
RBC CASTS # UR COMP ASSIST: SIGNIFICANT CHANGE UP (ref 0–?)
RSV RNA SPEC QL NAA+PROBE: NOT DETECTED — SIGNIFICANT CHANGE UP
RV+EV RNA SPEC QL NAA+PROBE: NOT DETECTED — SIGNIFICANT CHANGE UP
SP GR SPEC: 1.01 — SIGNIFICANT CHANGE UP (ref 1–1.04)
SPECIMEN SOURCE: SIGNIFICANT CHANGE UP
SPECIMEN SOURCE: SIGNIFICANT CHANGE UP
SQUAMOUS # UR AUTO: SIGNIFICANT CHANGE UP
UROBILINOGEN FLD QL: NORMAL — SIGNIFICANT CHANGE UP
WBC # BLD: 3.89 K/UL — SIGNIFICANT CHANGE UP (ref 3.8–10.5)
WBC # FLD AUTO: 3.89 K/UL — SIGNIFICANT CHANGE UP (ref 3.8–10.5)
WBC UR QL: SIGNIFICANT CHANGE UP (ref 0–?)

## 2019-07-02 RX ORDER — DOCUSATE SODIUM 100 MG
100 CAPSULE ORAL THREE TIMES A DAY
Refills: 0 | Status: DISCONTINUED | OUTPATIENT
Start: 2019-07-02 | End: 2019-07-03

## 2019-07-02 RX ORDER — SODIUM CHLORIDE 9 MG/ML
1000 INJECTION, SOLUTION INTRAVENOUS
Refills: 0 | Status: DISCONTINUED | OUTPATIENT
Start: 2019-07-02 | End: 2019-07-02

## 2019-07-02 RX ORDER — ACETAMINOPHEN 500 MG
975 TABLET ORAL EVERY 6 HOURS
Refills: 0 | Status: DISCONTINUED | OUTPATIENT
Start: 2019-07-02 | End: 2019-07-03

## 2019-07-02 RX ORDER — ERTAPENEM SODIUM 1 G/1
1000 INJECTION, POWDER, LYOPHILIZED, FOR SOLUTION INTRAMUSCULAR; INTRAVENOUS EVERY 24 HOURS
Refills: 0 | Status: DISCONTINUED | OUTPATIENT
Start: 2019-07-02 | End: 2019-07-03

## 2019-07-02 RX ORDER — IBUPROFEN 200 MG
600 TABLET ORAL EVERY 6 HOURS
Refills: 0 | Status: DISCONTINUED | OUTPATIENT
Start: 2019-07-02 | End: 2019-07-03

## 2019-07-02 RX ADMIN — Medication 975 MILLIGRAM(S): at 07:18

## 2019-07-02 RX ADMIN — Medication 975 MILLIGRAM(S): at 07:17

## 2019-07-02 RX ADMIN — ERTAPENEM SODIUM 120 MILLIGRAM(S): 1 INJECTION, POWDER, LYOPHILIZED, FOR SOLUTION INTRAMUSCULAR; INTRAVENOUS at 02:20

## 2019-07-02 RX ADMIN — Medication 100 MILLIGRAM(S): at 00:00

## 2019-07-02 RX ADMIN — SODIUM CHLORIDE 125 MILLILITER(S): 9 INJECTION, SOLUTION INTRAVENOUS at 02:20

## 2019-07-02 RX ADMIN — SODIUM CHLORIDE 125 MILLILITER(S): 9 INJECTION, SOLUTION INTRAVENOUS at 10:27

## 2019-07-02 RX ADMIN — Medication 650 MILLIGRAM(S): at 00:11

## 2019-07-02 RX ADMIN — Medication 975 MILLIGRAM(S): at 18:22

## 2019-07-02 NOTE — PROGRESS NOTE ADULT - SUBJECTIVE AND OBJECTIVE BOX
Late entry, patient seen at 0800 this morning. Patient without complaints. Fever/chills resolved. Minor vaginal bleeding c/w light period.     See flowsheet for vital signs.  Gen NAD  Abd soft/NT/not distended  [place] minimally saturated

## 2019-07-02 NOTE — ED ADULT NURSE REASSESSMENT NOTE - NS ED NURSE REASSESS COMMENT FT1
No acute distress at present. Respirations are even and unlabored on room air. Pt. is admitted to OBGYN, awaiting bed assignment. Report given to ESS[facility] 1 KALLI Jimenez. Receiving RN made aware of pt. last set of vital signs. Pt. will go to ESS[facility] 1 when ESSU 1 spot ready.

## 2019-07-02 NOTE — PROVIDER CONTACT NOTE (OTHER) - ACTION/TREATMENT ORDERED:
MD notified. States to observe pad and call back in one hour with description. Will continue to monitor.

## 2019-07-02 NOTE — H&P ADULT - NSHPREVIEWOFSYSTEMS_GEN_ALL_CORE
Review of Systems:   Patient denies headaches, blurry vision, lightheadedness dizziness, CP, SOB, nausea, vomiting, diarrhea, constipation, joint pain, leg edema.

## 2019-07-02 NOTE — ED ADULT NURSE REASSESSMENT NOTE - NS ED NURSE REASSESS COMMENT FT1
No acute distress at present. Pt. is resting comfortably on cardiac monitor. Denies dizziness. VS as noted. EKG in chart. MD JESSICA Lobo made aware of pt. vital signs. Fluids infusing as per order. Will continue to monitor and reassess blood pressure.

## 2019-07-02 NOTE — H&P ADULT - HISTORY OF PRESENT ILLNESS
34 y/o  s/p elective termination w/ D&C at approximately 7 weeks at [facility] on 19- uncomplicated now presenting with fever and weakness.  Patient reports feeling well post -op until this AM when woke up with fevers and chills.  Had fever to 100.4 this AM.  Took tylenol and fever resided.  Later in afternoon fever was 100.9.   She went to urgent care and was sent immediately to ED.   Also reports some foul smelling discharge from vagina.

## 2019-07-02 NOTE — PROGRESS NOTE ADULT - ASSESSMENT
32 yo  a/w endometritis following D&C 3 days ago at outside facility. Now afebrile and clinically improved on IV antibiotics.   Will observe and likely d/c home in AM on PO antibiotics if remains afebrile.

## 2019-07-02 NOTE — H&P ADULT - NSHPPHYSICALEXAM_GEN_ALL_CORE
General: sitting comfortably in bed, NAD.  Skin feels hot and clammy   HEENT: neck supple, full ROM  Back: No CVA tenderness  Abd: Soft, non-tender, non-distended.  Bowel sounds present.    :  No bleeding on pad.    External labia wnl.  Bimanual exam with no cervical motion tenderness, uterus wnl, adnexa non palpable b/l.  Cervix closed  Speculum Exam: No active bleeding from os.  Foul smelling vaginal discharge.    Ext: non-tender b/l, no edema

## 2019-07-02 NOTE — CONSULT NOTE ADULT - ASSESSMENT
33y  s/p D&C for elective termination here w/ c/o fever, foul smelling discharge.   TVUS w/o abnormality- findings c/w what would be seen post-termination.  No concern for retained products.  Given fever and foul smelling discharge concern for endometritis s/p D&C.  Patient clinically and hemodynamically stable however meeting SIRS criteria w/ high fever and tachycardia.

## 2019-07-02 NOTE — H&P ADULT - PROBLEM SELECTOR PLAN 1
Neuro: PO pain meds prn   CV: Hemodynamically stable  PUlm: saturating well on room air   GI: Regular diet  : Voiding freely  ID: Endometritis:  Will start Invanz.  To continue x 24-48 hours afebrile.   f/u Bcx and Ucx.  follow for clinical improvement.    Heme: SCD's for DVT ppx   Dispo: inpatient care.     Ashanti Clay PGY-4   d/w Dr. Kearney

## 2019-07-02 NOTE — PROVIDER CONTACT NOTE (OTHER) - ASSESSMENT
VSS. Pt reports vaginal bleeding. States bleeding is medium flow. Pt states she gets her period in the beginning of each month. Reports bleeding as occurred s/p transvaginal US of vagina.

## 2019-07-02 NOTE — H&P ADULT - NSHPLABSRESULTS_GEN_ALL_CORE
RADIOLOGY & ADDITIONAL STUDIES:    XAM:  US TRANSVAGINAL        PROCEDURE DATE:  Jul 1 2019         INTERPRETATION:  CLINICAL INFORMATION: Pelvic pain, fever, status post   D&C Saturday, 7 weeks pregnant    LMP: Unknown    COMPARISON: None available.    TECHNIQUE:     Endovaginal and transabdominal pelvic sonogram. Color and Spectral   Doppler was performed.     FINDINGS:    Uterus: 9.8 x 6.3 x 7.2 cm.    Endometrium: 2.3 mm. Heterogeneous, without central hyperemia, likely due   to blood products and sequela of recent D&C.    Right ovary: 4.1 x 2.5 x 4.0 cm. 1.6 x 1.5 x 1.4 cm right ovarian cyst.   Normal spectral Doppler waveforms.    Left ovary: Not visualized secondary to bowel gas.     Fluid: None.    IMPRESSION:    Thickened heterogeneous endometrium without vascular flow likely due to   blood products. Clinical correlation is recommended if there is concern   for endometritis.    Right ovarian cyst.

## 2019-07-02 NOTE — CONSULT NOTE ADULT - SUBJECTIVE AND OBJECTIVE BOX
GOPAL LUCAS  33y  Female [id number]    HPI:  34 y/o  s/p elective termination w/ D&C at approximately 7 weeks at [facility] on 19- uncomplicated now presenting with fever and weakness.  Patient reports feeling well post -op until this AM when woke up with fevers and chills.  Had fever to 100.4 this AM.  Took tylenol and fever resided.  Later in afternoon fever was 100.9.   She went to urgent care and was sent immediately to ED.   Also reports some foul smelling discharge from vagina.     Review of Systems:   Patient denies headaches, blurry vision, lightheadedness dizziness, CP, SOB, nausea, vomiting, diarrhea, constipation, joint pain, leg edema.     Name of GYN Physician: Dr. William    POB: 2019  FT.  Uncomplicated.      Pgyn: Denies fibroids, cysts, endometriosis, STI's.   hx of HPV - no interventions.  Most recent pap smear wnl    Home meds: denies    Allergies    penicillin (Rash)    Intolerances    PAST MEDICAL & SURGICAL HISTORY:  No significant past medical history  D&C x 1    Social History:  Denies smoking use, drug use +occasional social alcohol use    Vital Signs Last 24 Hrs  T(C): 37.2 (2019 00:12), Max: 38.5 (2019 20:10)  T(F): 99 (2019 00:12), Max: 101.3 (2019 20:10)  HR: 90 (2019 00:12) (90 - 111)  BP: 114/48 (2019 00:12) (111/74 - 133/82)  BP(mean): --  RR: 18 (2019 00:12) (16 - 18)  SpO2: 100% (2019 00:12) (100% - 100%)    Physical Exam:   General: sitting comfortably in bed, NAD.  Skin feels hot and clammy   HEENT: neck supple, full ROM  Back: No CVA tenderness  Abd: Soft, non-tender, non-distended.  Bowel sounds present.    :  No bleeding on pad.    External labia wnl.  Bimanual exam with no cervical motion tenderness, uterus wnl, adnexa non palpable b/l.  Cervix closed  Speculum Exam: No active bleeding from os.  Foul smelling vaginal discharge.    Ext: non-tender b/l, no edema     LABS:                              12.5   7.15  )-----------( 244      ( 2019 22:30 )             37.9     -    140  |  104  |  7   ----------------------------<  105<H>  3.6   |  23  |  0.69    Ca    9.4      2019 22:30    TPro  7.0  /  Alb  4.1  /  TBili  0.3  /  DBili  x   /  AST  14  /  ALT  14  /  AlkPhos  54      I&O's Detail    PT/INR - ( 2019 22:30 )   PT: 12.5 SEC;   INR: 1.12          PTT - ( 2019 22:30 )  PTT:28.5 SEC      RADIOLOGY & ADDITIONAL STUDIES:    XAM:  US TRANSVAGINAL        PROCEDURE DATE:  2019         INTERPRETATION:  CLINICAL INFORMATION: Pelvic pain, fever, status post   D&C Saturday, 7 weeks pregnant    LMP: Unknown    COMPARISON: None available.    TECHNIQUE:     Endovaginal and transabdominal pelvic sonogram. Color and Spectral   Doppler was performed.     FINDINGS:    Uterus: 9.8 x 6.3 x 7.2 cm.    Endometrium: 2.3 mm. Heterogeneous, without central hyperemia, likely due   to blood products and sequela of recent D&C.    Right ovary: 4.1 x 2.5 x 4.0 cm. 1.6 x 1.5 x 1.4 cm right ovarian cyst.   Normal spectral Doppler waveforms.    Left ovary: Not visualized secondary to bowel gas.     Fluid: None.    IMPRESSION:    Thickened heterogeneous endometrium without vascular flow likely due to   blood products. Clinical correlation is recommended if there is concern   for endometritis.    Right ovarian cyst.               RODRIGO COOL M.D., RADIOLOGY RESIDENT  This document has been electronically signed.  OPHELIA BALDERAS M.D., RADIOLOGIST  This document has been electronically signed. 2019 11:49PM

## 2019-07-03 ENCOUNTER — TRANSCRIPTION ENCOUNTER (OUTPATIENT)
Age: 34
End: 2019-07-03

## 2019-07-03 VITALS
HEART RATE: 54 BPM | OXYGEN SATURATION: 98 % | DIASTOLIC BLOOD PRESSURE: 63 MMHG | TEMPERATURE: 98 F | RESPIRATION RATE: 18 BRPM | SYSTOLIC BLOOD PRESSURE: 100 MMHG

## 2019-07-03 LAB
BACTERIA UR CULT: SIGNIFICANT CHANGE UP
BASOPHILS # BLD AUTO: 0.03 K/UL — SIGNIFICANT CHANGE UP (ref 0–0.2)
BASOPHILS NFR BLD AUTO: 0.8 % — SIGNIFICANT CHANGE UP (ref 0–2)
EOSINOPHIL # BLD AUTO: 0.11 K/UL — SIGNIFICANT CHANGE UP (ref 0–0.5)
EOSINOPHIL NFR BLD AUTO: 2.9 % — SIGNIFICANT CHANGE UP (ref 0–6)
HCT VFR BLD CALC: 37.6 % — SIGNIFICANT CHANGE UP (ref 34.5–45)
HGB BLD-MCNC: 12.3 G/DL — SIGNIFICANT CHANGE UP (ref 11.5–15.5)
IMM GRANULOCYTES NFR BLD AUTO: 0.3 % — SIGNIFICANT CHANGE UP (ref 0–1.5)
LYMPHOCYTES # BLD AUTO: 1.68 K/UL — SIGNIFICANT CHANGE UP (ref 1–3.3)
LYMPHOCYTES # BLD AUTO: 44 % — SIGNIFICANT CHANGE UP (ref 13–44)
MCHC RBC-ENTMCNC: 29 PG — SIGNIFICANT CHANGE UP (ref 27–34)
MCHC RBC-ENTMCNC: 32.7 % — SIGNIFICANT CHANGE UP (ref 32–36)
MCV RBC AUTO: 88.7 FL — SIGNIFICANT CHANGE UP (ref 80–100)
MONOCYTES # BLD AUTO: 0.64 K/UL — SIGNIFICANT CHANGE UP (ref 0–0.9)
MONOCYTES NFR BLD AUTO: 16.8 % — HIGH (ref 2–14)
NEUTROPHILS # BLD AUTO: 1.35 K/UL — LOW (ref 1.8–7.4)
NEUTROPHILS NFR BLD AUTO: 35.2 % — LOW (ref 43–77)
NRBC # FLD: 0 K/UL — SIGNIFICANT CHANGE UP (ref 0–0)
PLATELET # BLD AUTO: 229 K/UL — SIGNIFICANT CHANGE UP (ref 150–400)
PMV BLD: 9.7 FL — SIGNIFICANT CHANGE UP (ref 7–13)
RBC # BLD: 4.24 M/UL — SIGNIFICANT CHANGE UP (ref 3.8–5.2)
RBC # FLD: 12.6 % — SIGNIFICANT CHANGE UP (ref 10.3–14.5)
SPECIMEN SOURCE: SIGNIFICANT CHANGE UP
WBC # BLD: 3.82 K/UL — SIGNIFICANT CHANGE UP (ref 3.8–10.5)
WBC # FLD AUTO: 3.82 K/UL — SIGNIFICANT CHANGE UP (ref 3.8–10.5)

## 2019-07-03 PROCEDURE — 99238 HOSP IP/OBS DSCHRG MGMT 30/<: CPT | Mod: GC

## 2019-07-03 RX ADMIN — ERTAPENEM SODIUM 120 MILLIGRAM(S): 1 INJECTION, POWDER, LYOPHILIZED, FOR SOLUTION INTRAMUSCULAR; INTRAVENOUS at 02:41

## 2019-07-03 RX ADMIN — Medication 975 MILLIGRAM(S): at 10:22

## 2019-07-03 NOTE — PROGRESS NOTE ADULT - SUBJECTIVE AND OBJECTIVE BOX
R1 KEVIN Progress Note   HD#2    Patient seen and examined at bedside.  No acute events overnight. Patient had some mild chest discomfort when she was being administered her antibiotics this morning. It has since then subsided.  Patient is ambulating and tolerating a normal diet. Denies, SOB, N/V, fevers, and chills.    Vital Signs Last 24 Hours  T(C): 36.7 (07-03-19 @ 05:59), Max: 36.9 (07-02-19 @ 16:45)  HR: 61 (07-03-19 @ 05:59) (59 - 75)  BP: 111/56 (07-03-19 @ 05:59) (98/55 - 116/64)  RR: 14 (07-03-19 @ 05:59) (14 - 18)  SpO2: 100% (07-03-19 @ 05:59) (99% - 100%)    I&O's Summary    02 Jul 2019 07:01  -  03 Jul 2019 07:00  --------------------------------------------------------  IN: 0 mL / OUT: 1350 mL / NET: -1350 mL        Physical Exam:  General: NAD  CV: RR, S1, S2, no M/R/G  Lungs: CTA b/l, good air flow b/l   Abdomen: Soft, non-tender to palpation, non-distended, normoactive bowel sounds  Ext: No pain or swelling     Labs:                        12.3   3.82  )-----------( 229      ( 03 Jul 2019 05:30 )             37.6   baso 0.8    eos 2.9    imm gran 0.3    lymph 44.0   mono 16.8   poly 35.2                         11.5   3.89  )-----------( 222      ( 02 Jul 2019 09:11 )             34.6   baso x      eos x      imm gran x      lymph x      mono x      poly x                            12.5   7.15  )-----------( 244      ( 01 Jul 2019 22:30 )             37.9   baso 0.3    eos 0.3    imm gran 0.4    lymph 9.1    mono 9.0    poly 80.9       MEDICATIONS  (STANDING):  acetaminophen   Tablet .. 975 milliGRAM(s) Oral every 6 hours  ertapenem  IVPB 1000 milliGRAM(s) IV Intermittent every 24 hours    MEDICATIONS  (PRN):  docusate sodium 100 milliGRAM(s) Oral three times a day PRN stool softening.  ibuprofen  Tablet. 600 milliGRAM(s) Oral every 6 hours PRN Moderate Pain (4 - 6)      A/P: 33y  a/w endometritis s/p D&C for eTOP on 6/29/19 one day two of Ivanz.    Neuro: PO pain meds.   CV: Hemodynamically stable  Pulm: Saturating well on room air, encourage oob/amb  GI: Continue regular diet  :  Voiding spontaneously   Heme: c/w SCDs for DVT ppx  FEN: Patient intake is adequate.   ID: Afebrile. Will transition to PO antibiotics for discharge.  Endo: No active issues   Dispo: Continue routine post-op care    Efra Wilde PGY1 R1 KEVIN Progress Note   HD#2    Patient seen and examined at bedside.  No acute events overnight. Patient had some mild chest discomfort when she was being administered her antibiotics this morning. It has since then subsided.  Patient is ambulating and tolerating a normal diet. Denies, SOB, N/V, fevers, and chills.    Vital Signs Last 24 Hours  T(C): 36.7 (07-03-19 @ 05:59), Max: 36.9 (07-02-19 @ 16:45)  HR: 61 (07-03-19 @ 05:59) (59 - 75)  BP: 111/56 (07-03-19 @ 05:59) (98/55 - 116/64)  RR: 14 (07-03-19 @ 05:59) (14 - 18)  SpO2: 100% (07-03-19 @ 05:59) (99% - 100%)    I&O's Summary    02 Jul 2019 07:01  -  03 Jul 2019 07:00  --------------------------------------------------------  IN: 0 mL / OUT: 1350 mL / NET: -1350 mL        Physical Exam:  General: NAD  CV: RR, S1, S2, no M/R/G  Lungs: CTA b/l, good air flow b/l   Abdomen: Soft, non-tender to palpation, non-distended, normoactive bowel sounds  Ext: No pain or swelling     Labs:                        12.3   3.82  )-----------( 229      ( 03 Jul 2019 05:30 )             37.6   baso 0.8    eos 2.9    imm gran 0.3    lymph 44.0   mono 16.8   poly 35.2                         11.5   3.89  )-----------( 222      ( 02 Jul 2019 09:11 )             34.6   baso x      eos x      imm gran x      lymph x      mono x      poly x                            12.5   7.15  )-----------( 244      ( 01 Jul 2019 22:30 )             37.9   baso 0.3    eos 0.3    imm gran 0.4    lymph 9.1    mono 9.0    poly 80.9       MEDICATIONS  (STANDING):  acetaminophen   Tablet .. 975 milliGRAM(s) Oral every 6 hours  ertapenem  IVPB 1000 milliGRAM(s) IV Intermittent every 24 hours    MEDICATIONS  (PRN):  docusate sodium 100 milliGRAM(s) Oral three times a day PRN stool softening.  ibuprofen  Tablet. 600 milliGRAM(s) Oral every 6 hours PRN Moderate Pain (4 - 6) R1 KEVIN Progress Note   HD#2    Patient seen and examined at bedside.  No acute events overnight. Patient had some mild chest discomfort when she was being administered her antibiotics this morning. It has since then subsided.  Patient is ambulating and tolerating a normal diet. Denies, SOB, N/V, fevers, and chills.    Vital Signs Last 24 Hours  T(C): 36.7 (07-03-19 @ 05:59), Max: 36.9 (07-02-19 @ 16:45)  HR: 61 (07-03-19 @ 05:59) (59 - 75)  BP: 111/56 (07-03-19 @ 05:59) (98/55 - 116/64)  RR: 14 (07-03-19 @ 05:59) (14 - 18)  SpO2: 100% (07-03-19 @ 05:59) (99% - 100%)    I&O's Summary    02 Jul 2019 07:01  -  03 Jul 2019 07:00  --------------------------------------------------------  IN: 0 mL / OUT: 1350 mL / NET: -1350 mL        Physical Exam:  General: NAD  CV: RR, S1, S2, no M/R/G  Lungs: CTA b/l, good air flow b/l   Abdomen: Soft, non-tender to palpation, non-distended, normoactive bowel sounds  :  No bleeding on pad.    External labia wnl.  Ext: No pain or swelling     Labs:                        12.3   3.82  )-----------( 229      ( 03 Jul 2019 05:30 )             37.6   baso 0.8    eos 2.9    imm gran 0.3    lymph 44.0   mono 16.8   poly 35.2                         11.5   3.89  )-----------( 222      ( 02 Jul 2019 09:11 )             34.6   baso x      eos x      imm gran x      lymph x      mono x      poly x                            12.5   7.15  )-----------( 244      ( 01 Jul 2019 22:30 )             37.9   baso 0.3    eos 0.3    imm gran 0.4    lymph 9.1    mono 9.0    poly 80.9       MEDICATIONS  (STANDING):  acetaminophen   Tablet .. 975 milliGRAM(s) Oral every 6 hours  ertapenem  IVPB 1000 milliGRAM(s) IV Intermittent every 24 hours    MEDICATIONS  (PRN):  docusate sodium 100 milliGRAM(s) Oral three times a day PRN stool softening.  ibuprofen  Tablet. 600 milliGRAM(s) Oral every 6 hours PRN Moderate Pain (4 - 6)

## 2019-07-03 NOTE — PROVIDER CONTACT NOTE (OTHER) - ASSESSMENT
Pt c/o "chest discomfort", no SOB/dizziness, lightheadedness, or abnormal findings other than chest discomfort.

## 2019-07-03 NOTE — DISCHARGE NOTE PROVIDER - CARE PROVIDER_API CALL
Ck Sanabria)  OBSGYN  General  [facility], [address]th [place] [address]  Phone: [phone]  Fax: [phone]  Follow Up Time:

## 2019-07-03 NOTE — PROGRESS NOTE ADULT - PROBLEM SELECTOR PLAN 1
Neuro: PO pain meds.   CV: Hemodynamically stable  Pulm: Saturating well on room air, encourage oob/amb  GI: Continue regular diet  :  Voiding spontaneously   Heme: c/w SCDs for DVT ppx  FEN: Patient intake is adequate.   ID: Afebrile. Will transition to PO antibiotics for discharge.  Endo: No active issues   Dispo: Plan to discharge with PO antibiotics and f/u outpatient.    Efra Wilde PGY1 Neuro: PO pain meds.   CV: Hemodynamically stable  Pulm: Saturating well on room air, encourage oob/amb  GI: Continue regular diet  :  Voiding spontaneously   Heme: c/w SCDs for DVT ppx  FEN: Patient intake is adequate.   ID: Afebrile. Will transition to PO antibiotics for discharge.  Endo: No active issues   Dispo: Plan to discharge with PO antibiotics and f/u outpatient.    Efra Wilde PGY1    Seen and examined with team on rounds. Will transition to clinda and eval for DC with outpatient follow up in 1 week.    d/w Dr. Elly Ortega PGY4

## 2019-07-03 NOTE — DISCHARGE NOTE PROVIDER - HOSPITAL COURSE
Patient admitted with endometritis following TOP at outside facility. CBC trend as below. Patient’s hospital course was unremarkable and she remained hemodynamically stable and afebrile throughout. She was started on Invanz on 7/2 and transitioned to PO clinda for discharge. Upon discharge on POD#2, the patient is ambulating and voiding spontaneously, tolerating oral intake, pain was well controlled with oral medication, and vital signs were stable.                   12.3     3.82  )-----------( 229      ( 07-03 @ 05:30 )               37.6                    11.5     3.89  )-----------( 222      ( 07-02 @ 09:11 )               34.6                    12.5     7.15  )-----------( 244      ( 07-01 @ 22:30 )               37.9

## 2019-07-03 NOTE — DISCHARGE NOTE NURSING/CASE MANAGEMENT/SOCIAL WORK - NSDCDPATPORTLINK_GEN_ALL_CORE
You can access the [facility] Patient Portal, offered by , by registering with the following website: [email or web address]

## 2019-07-03 NOTE — DISCHARGE NOTE PROVIDER - NSDCCPCAREPLAN_GEN_ALL_CORE_FT
PRINCIPAL DISCHARGE DIAGNOSIS  Diagnosis: Fever, unspecified fever cause  Assessment and Plan of Treatment:

## 2019-07-03 NOTE — PROGRESS NOTE ADULT - ASSESSMENT
33y  a/w endometritis s/p D&C for eTOP on 6/29/19 one day two of Ivanz. 33y  a/w endometritis s/p D&C for eTOP on 6/29/19 one day two of Ivanz. Patient has been afebrile for over 24 hours. Will f/u blood and urine cultures. Otherwise is stable and will transition to PO Antibiotics for discharge.

## 2019-07-06 LAB
BACTERIA BLD CULT: SIGNIFICANT CHANGE UP
BACTERIA BLD CULT: SIGNIFICANT CHANGE UP

## 2019-07-24 ENCOUNTER — APPOINTMENT (OUTPATIENT)
Dept: OBGYN | Facility: CLINIC | Age: 34
End: 2019-07-24
Payer: COMMERCIAL

## 2019-07-24 VITALS
WEIGHT: 162 LBS | HEART RATE: 84 BPM | SYSTOLIC BLOOD PRESSURE: 111 MMHG | HEIGHT: 63 IN | BODY MASS INDEX: 28.7 KG/M2 | DIASTOLIC BLOOD PRESSURE: 76 MMHG

## 2019-07-24 DIAGNOSIS — R30.0 DYSURIA: ICD-10-CM

## 2019-07-24 DIAGNOSIS — O04.80 (INDUCED) TERMINATION OF PREGNANCY WITH UNSPECIFIED COMPLICATIONS: ICD-10-CM

## 2019-07-24 DIAGNOSIS — F32.9 MAJOR DEPRESSIVE DISORDER, SINGLE EPISODE, UNSPECIFIED: ICD-10-CM

## 2019-07-24 DIAGNOSIS — Z30.09 ENCOUNTER FOR OTHER GENERAL COUNSELING AND ADVICE ON CONTRACEPTION: ICD-10-CM

## 2019-07-24 PROCEDURE — 99214 OFFICE O/P EST MOD 30 MIN: CPT

## 2019-07-29 ENCOUNTER — OTHER (OUTPATIENT)
Age: 34
End: 2019-07-29

## 2019-07-31 LAB
BACTERIA UR CULT: NORMAL
C TRACH RRNA SPEC QL NAA+PROBE: NOT DETECTED
N GONORRHOEA RRNA SPEC QL NAA+PROBE: NOT DETECTED
SOURCE AMPLIFICATION: NORMAL

## 2019-07-31 NOTE — PHYSICAL EXAM
[Awake] : awake [Alert] : alert [Acute Distress] : no acute distress [Soft] : soft [Tender] : non tender [Normal] : uterus [No Bleeding] : there was no active vaginal bleeding [Uterine Adnexae] : were not tender and not enlarged

## 2019-08-27 ENCOUNTER — OUTPATIENT (OUTPATIENT)
Dept: OUTPATIENT SERVICES | Facility: HOSPITAL | Age: 34
LOS: 1 days | Discharge: HOME | End: 2019-08-27

## 2019-08-27 DIAGNOSIS — Z98.890 OTHER SPECIFIED POSTPROCEDURAL STATES: Chronic | ICD-10-CM

## 2019-08-27 LAB
ALBUMIN SERPL ELPH-MCNC: 4.7 G/DL — SIGNIFICANT CHANGE UP (ref 3.3–5)
ALP SERPL-CCNC: 60 U/L — SIGNIFICANT CHANGE UP (ref 40–120)
ALT FLD-CCNC: 12 U/L — SIGNIFICANT CHANGE UP (ref 4–33)
ANION GAP SERPL CALC-SCNC: 11 MMO/L — SIGNIFICANT CHANGE UP (ref 7–14)
AST SERPL-CCNC: 15 U/L — SIGNIFICANT CHANGE UP (ref 4–32)
BASOPHILS # BLD AUTO: 0.04 K/UL — SIGNIFICANT CHANGE UP (ref 0–0.2)
BASOPHILS NFR BLD AUTO: 0.8 % — SIGNIFICANT CHANGE UP (ref 0–2)
BILIRUB SERPL-MCNC: 0.6 MG/DL — SIGNIFICANT CHANGE UP (ref 0.2–1.2)
BUN SERPL-MCNC: 10 MG/DL — SIGNIFICANT CHANGE UP (ref 7–23)
CALCIUM SERPL-MCNC: 9.4 MG/DL — SIGNIFICANT CHANGE UP (ref 8.4–10.5)
CHLORIDE SERPL-SCNC: 102 MMOL/L — SIGNIFICANT CHANGE UP (ref 98–107)
CO2 SERPL-SCNC: 26 MMOL/L — SIGNIFICANT CHANGE UP (ref 22–31)
CREAT SERPL-MCNC: 0.74 MG/DL — SIGNIFICANT CHANGE UP (ref 0.5–1.3)
EOSINOPHIL # BLD AUTO: 0.09 K/UL — SIGNIFICANT CHANGE UP (ref 0–0.5)
EOSINOPHIL NFR BLD AUTO: 1.9 % — SIGNIFICANT CHANGE UP (ref 0–6)
GLUCOSE SERPL-MCNC: 91 MG/DL — SIGNIFICANT CHANGE UP (ref 70–99)
HCT VFR BLD CALC: 43.4 % — SIGNIFICANT CHANGE UP (ref 34.5–45)
HGB BLD-MCNC: 13.9 G/DL — SIGNIFICANT CHANGE UP (ref 11.5–15.5)
IMM GRANULOCYTES NFR BLD AUTO: 0.2 % — SIGNIFICANT CHANGE UP (ref 0–1.5)
LYMPHOCYTES # BLD AUTO: 1.42 K/UL — SIGNIFICANT CHANGE UP (ref 1–3.3)
LYMPHOCYTES # BLD AUTO: 29.3 % — SIGNIFICANT CHANGE UP (ref 13–44)
MCHC RBC-ENTMCNC: 28.9 PG — SIGNIFICANT CHANGE UP (ref 27–34)
MCHC RBC-ENTMCNC: 32 % — SIGNIFICANT CHANGE UP (ref 32–36)
MCV RBC AUTO: 90.2 FL — SIGNIFICANT CHANGE UP (ref 80–100)
MONOCYTES # BLD AUTO: 0.39 K/UL — SIGNIFICANT CHANGE UP (ref 0–0.9)
MONOCYTES NFR BLD AUTO: 8 % — SIGNIFICANT CHANGE UP (ref 2–14)
NEUTROPHILS # BLD AUTO: 2.9 K/UL — SIGNIFICANT CHANGE UP (ref 1.8–7.4)
NEUTROPHILS NFR BLD AUTO: 59.8 % — SIGNIFICANT CHANGE UP (ref 43–77)
NRBC # FLD: 0 K/UL — SIGNIFICANT CHANGE UP (ref 0–0)
PLATELET # BLD AUTO: 303 K/UL — SIGNIFICANT CHANGE UP (ref 150–400)
PMV BLD: 10.2 FL — SIGNIFICANT CHANGE UP (ref 7–13)
POTASSIUM SERPL-MCNC: 4.2 MMOL/L — SIGNIFICANT CHANGE UP (ref 3.5–5.3)
POTASSIUM SERPL-SCNC: 4.2 MMOL/L — SIGNIFICANT CHANGE UP (ref 3.5–5.3)
PROT SERPL-MCNC: 7.9 G/DL — SIGNIFICANT CHANGE UP (ref 6–8.3)
RBC # BLD: 4.81 M/UL — SIGNIFICANT CHANGE UP (ref 3.8–5.2)
RBC # FLD: 13.2 % — SIGNIFICANT CHANGE UP (ref 10.3–14.5)
SODIUM SERPL-SCNC: 139 MMOL/L — SIGNIFICANT CHANGE UP (ref 135–145)
TSH SERPL-MCNC: 1.64 UIU/ML — SIGNIFICANT CHANGE UP (ref 0.27–4.2)
WBC # BLD: 4.85 K/UL — SIGNIFICANT CHANGE UP (ref 3.8–10.5)
WBC # FLD AUTO: 4.85 K/UL — SIGNIFICANT CHANGE UP (ref 3.8–10.5)

## 2019-08-28 DIAGNOSIS — F32.9 MAJOR DEPRESSIVE DISORDER, SINGLE EPISODE, UNSPECIFIED: ICD-10-CM

## 2019-10-03 ENCOUNTER — APPOINTMENT (OUTPATIENT)
Dept: OBGYN | Facility: CLINIC | Age: 34
End: 2019-10-03

## 2019-11-25 ENCOUNTER — TRANSCRIPTION ENCOUNTER (OUTPATIENT)
Age: 34
End: 2019-11-25

## 2020-01-29 NOTE — ED PROVIDER NOTE - PROGRESS NOTE DETAILS
Pt complains of sore throat and cough since yesterday.  Denies fever Sepsis - undifferentiated  1) IV Access/IVF/LABS/UA/Cultures  2) CXR  3) IV Abx  4) Admit fluids given, antibxs given, temperature improved, will admit to gyn service for further management, pt stable.

## 2020-03-09 ENCOUNTER — TRANSCRIPTION ENCOUNTER (OUTPATIENT)
Age: 35
End: 2020-03-09

## 2020-07-12 ENCOUNTER — APPOINTMENT (OUTPATIENT)
Dept: OBGYN | Facility: CLINIC | Age: 35
End: 2020-07-12
Payer: COMMERCIAL

## 2020-07-12 VITALS
HEART RATE: 83 BPM | BODY MASS INDEX: 29.77 KG/M2 | TEMPERATURE: 98.1 F | SYSTOLIC BLOOD PRESSURE: 110 MMHG | DIASTOLIC BLOOD PRESSURE: 72 MMHG | WEIGHT: 168 LBS | HEIGHT: 63 IN

## 2020-07-12 DIAGNOSIS — Z01.419 ENCOUNTER FOR GYNECOLOGICAL EXAMINATION (GENERAL) (ROUTINE) W/OUT ABNORMAL FINDINGS: ICD-10-CM

## 2020-07-12 PROCEDURE — 99395 PREV VISIT EST AGE 18-39: CPT

## 2020-07-13 LAB
C TRACH RRNA SPEC QL NAA+PROBE: NOT DETECTED
N GONORRHOEA RRNA SPEC QL NAA+PROBE: NOT DETECTED
SOURCE AMPLIFICATION: NORMAL

## 2020-07-14 LAB
CANDIDA VAG CYTO: NOT DETECTED
G VAGINALIS+PREV SP MTYP VAG QL MICRO: NOT DETECTED
T VAGINALIS VAG QL WET PREP: NOT DETECTED

## 2020-07-16 LAB — CYTOLOGY CVX/VAG DOC THIN PREP: ABNORMAL

## 2020-07-17 LAB — HPV HIGH+LOW RISK DNA PNL CVX: NOT DETECTED

## 2020-12-23 PROBLEM — Z01.419 ENCOUNTER FOR ANNUAL ROUTINE GYNECOLOGICAL EXAMINATION: Status: RESOLVED | Noted: 2020-07-12 | Resolved: 2020-12-23

## 2021-04-26 NOTE — ED CDU PROVIDER INITIAL DAY NOTE - CROS ED PSYCH ALL NEG
----- Message from Sona Oli sent at 4/26/2021 11:06 AM EDT -----  Subject: Refill Request    QUESTIONS  Name of Medication? metFORMIN (GLUCOPHAGE) 500 MG tablet  Patient-reported dosage and instructions? 500 mg once daily   How many days do you have left? 0  Preferred Pharmacy? [id number]  Pharmacy phone number (if available)? [phone]  ---------------------------------------------------------------------------  --------------  Sai APODACA  What is the best way for the office to contact you? OK to leave message on   voicemail  Preferred Call Back Phone Number?  [phone]
Next ov 5/4/21  Last filled  3/9/20, #90 with 5 refills
negative...

## 2022-04-13 NOTE — ED ADULT NURSE NOTE - NSSUSCREENINGQ4_ED_ALL_ED
[facility]  [address], [place], [address]  Tel. [phone]  Fax: [phone]    Physical Therapy Discharge Summary    Patient Name: Delaney Paul : 1954   Treatment/Medical Diagnosis: Other low back pain [M54.59]  Myalgia, other site [M79.18]   Referral Source: Yaritza Atwood MD     Date of Initial Visit: 1/10/22 Attended Visits: 13 Missed Visits: 6       SUMMARY OF TREATMENT  Pt attended initial evaluation and     12     follow-ups with the last attended visit on 3/10/22 due to having hernia surgery. Therefore a formal reassessment of goals was not performed. RECOMMENDATIONS  Discontinue physical therapy due to villanueva in medical status. Pt shall remain under care of MD.      If you have any questions/comments please contact us directly at [phone]. Thank you for allowing us to assist in the care of your patient.     Therapist Signature: Lakia Rodriguez PT, DPT, CIMT Date: 22     Time: 10:30 AM No

## 2022-11-23 ENCOUNTER — TRANSCRIPTION ENCOUNTER (OUTPATIENT)
Age: 37
End: 2022-11-23

## 2022-11-23 ENCOUNTER — APPOINTMENT (OUTPATIENT)
Dept: OBGYN | Facility: CLINIC | Age: 37
End: 2022-11-23

## 2022-11-23 VITALS
BODY MASS INDEX: 31.89 KG/M2 | HEART RATE: 81 BPM | DIASTOLIC BLOOD PRESSURE: 89 MMHG | WEIGHT: 180 LBS | SYSTOLIC BLOOD PRESSURE: 143 MMHG | HEIGHT: 63 IN

## 2022-11-23 VITALS — DIASTOLIC BLOOD PRESSURE: 81 MMHG | SYSTOLIC BLOOD PRESSURE: 118 MMHG | HEART RATE: 90 BPM

## 2022-11-23 DIAGNOSIS — Z01.411 ENCOUNTER FOR GYNECOLOGICAL EXAMINATION (GENERAL) (ROUTINE) WITH ABNORMAL FINDINGS: ICD-10-CM

## 2022-11-23 DIAGNOSIS — N91.2 AMENORRHEA, UNSPECIFIED: ICD-10-CM

## 2022-11-23 PROCEDURE — 99395 PREV VISIT EST AGE 18-39: CPT

## 2022-11-23 NOTE — PHYSICAL EXAM
[Chaperone Present] : A chaperone was present in the examining room during all aspects of the physical examination [Appropriately responsive] : appropriately responsive [Alert] : alert [No Acute Distress] : no acute distress [No Lymphadenopathy] : no lymphadenopathy [Soft] : soft [Non-tender] : non-tender [Non-distended] : non-distended [No HSM] : No HSM [No Lesions] : no lesions [No Mass] : no mass [Oriented x3] : oriented x3 [Examination Of The Breasts] : a normal appearance [No Masses] : no breast masses were palpable [Labia Minora] : normal [Labia Majora] : normal [Normal] : normal [Uterine Adnexae] : normal [Pink Rugae] : pink rugae

## 2022-11-25 LAB
BASOPHILS # BLD AUTO: 0.03 K/UL
BASOPHILS NFR BLD AUTO: 0.4 %
C TRACH RRNA SPEC QL NAA+PROBE: NOT DETECTED
EOSINOPHIL # BLD AUTO: 0.08 K/UL
EOSINOPHIL NFR BLD AUTO: 1 %
ESTIMATED AVERAGE GLUCOSE: 108 MG/DL
ESTRADIOL SERPL-MCNC: 48 PG/ML
FSH SERPL-MCNC: 4.4 IU/L
HBA1C MFR BLD HPLC: 5.4 %
HBV SURFACE AG SER QL: NONREACTIVE
HCG SERPL-MCNC: <1 MIU/ML
HCT VFR BLD CALC: 43.5 %
HCV AB SER QL: NONREACTIVE
HCV S/CO RATIO: 0.26 S/CO
HGB BLD-MCNC: 14.2 G/DL
HIV1+2 AB SPEC QL IA.RAPID: NONREACTIVE
HPV HIGH+LOW RISK DNA PNL CVX: NOT DETECTED
IMM GRANULOCYTES NFR BLD AUTO: 0.3 %
INSULIN SERPL-MCNC: 5.8 UU/ML
LH SERPL-ACNC: 4.7 IU/L
LYMPHOCYTES # BLD AUTO: 2.35 K/UL
LYMPHOCYTES NFR BLD AUTO: 30.3 %
MAN DIFF?: NORMAL
MCHC RBC-ENTMCNC: 29.9 PG
MCHC RBC-ENTMCNC: 32.6 GM/DL
MCV RBC AUTO: 91.6 FL
MONOCYTES # BLD AUTO: 0.64 K/UL
MONOCYTES NFR BLD AUTO: 8.2 %
N GONORRHOEA RRNA SPEC QL NAA+PROBE: NOT DETECTED
NEUTROPHILS # BLD AUTO: 4.64 K/UL
NEUTROPHILS NFR BLD AUTO: 59.8 %
PLATELET # BLD AUTO: 300 K/UL
PROLACTIN SERPL-MCNC: 11.4 NG/ML
RBC # BLD: 4.75 M/UL
RBC # FLD: 12.8 %
SOURCE AMPLIFICATION: NORMAL
T PALLIDUM AB SER QL IA: NEGATIVE
T3FREE SERPL-MCNC: 2.99 PG/ML
T4 FREE SERPL-MCNC: 1.1 NG/DL
TSH SERPL-ACNC: 2.68 UIU/ML
WBC # FLD AUTO: 7.76 K/UL

## 2022-11-28 DIAGNOSIS — N92.6 IRREGULAR MENSTRUATION, UNSPECIFIED: ICD-10-CM

## 2022-11-28 LAB
TESTOST FREE SERPL-MCNC: 15.3 PG/ML
TESTOST SERPL-MCNC: 51.4 NG/DL

## 2022-11-28 NOTE — HISTORY OF PRESENT ILLNESS
[Patient reported PAP Smear was abnormal] : Patient reported PAP Smear was abnormal [FreeTextEntry1] : 37 year old P0 LMP 10/19/22 presents for annual gyn visit.\par \par She denies intermenstrual bleeding, itching, burning or abnormal discharge. \par \par Pt reports amenorrhea, and negative pregnancy test taken last Sunday. Pt reports menses cycles has changed recently and now ranges from 28-32 days. Pt is currently taking prenatal and trying to conceive. Pt reports breasts are darker and veiny. Pt reports feeling dizziness, but denies headaches. Pt's mother has Hashimoto's.\par  [Mammogramdate] : 04/19 [TextBox_19] : left palpable mass [PapSmeardate] : 07/20 [LMPDate] : 10/19/22

## 2022-11-28 NOTE — PLAN
[FreeTextEntry1] : GOPAL is a 37 year year old female presenting for well woman exam. Sexually active, monogamous with 1 male partner. \par \par HCM\par pap/hpv\par \par Amenorrhea\par discussed causes of abnormal uterine bleeding including but not limited to fibroids, polyps, adenomyosis, endometriosis, malignancy, coagulation abnormalities, anovulation, pcos, hormonal dysfunction, weight gain/loss and stress\par will draw bloodwork including hormonal panel today\par patient to get pelvic sono and to return to office to discuss results\par

## 2022-12-02 LAB
ANTI-MUELLERIAN HORMONE: 1.96 NG/ML
DHEA-SULFATE, SERUM: 278 UG/DL

## 2022-12-05 LAB — CYTOLOGY CVX/VAG DOC THIN PREP: ABNORMAL

## 2022-12-08 ENCOUNTER — ASOB RESULT (OUTPATIENT)
Age: 37
End: 2022-12-08

## 2022-12-08 ENCOUNTER — APPOINTMENT (OUTPATIENT)
Dept: OBGYN | Facility: CLINIC | Age: 37
End: 2022-12-08

## 2022-12-08 VITALS
SYSTOLIC BLOOD PRESSURE: 122 MMHG | RESPIRATION RATE: 18 BRPM | DIASTOLIC BLOOD PRESSURE: 80 MMHG | HEART RATE: 84 BPM | OXYGEN SATURATION: 98 % | HEIGHT: 63 IN

## 2022-12-08 DIAGNOSIS — R79.89 OTHER SPECIFIED ABNORMAL FINDINGS OF BLOOD CHEMISTRY: ICD-10-CM

## 2022-12-08 PROCEDURE — 76830 TRANSVAGINAL US NON-OB: CPT

## 2022-12-08 PROCEDURE — 99213 OFFICE O/P EST LOW 20 MIN: CPT

## 2022-12-19 ENCOUNTER — NON-APPOINTMENT (OUTPATIENT)
Age: 37
End: 2022-12-19

## 2022-12-19 LAB
TESTOST FREE SERPL-MCNC: 8.5 PG/ML
TESTOST SERPL-MCNC: 49.9 NG/DL

## 2023-01-24 ENCOUNTER — ASOB RESULT (OUTPATIENT)
Age: 38
End: 2023-01-24

## 2023-01-24 ENCOUNTER — APPOINTMENT (OUTPATIENT)
Dept: OBGYN | Facility: CLINIC | Age: 38
End: 2023-01-24
Payer: COMMERCIAL

## 2023-01-24 VITALS
HEART RATE: 91 BPM | SYSTOLIC BLOOD PRESSURE: 109 MMHG | DIASTOLIC BLOOD PRESSURE: 73 MMHG | WEIGHT: 183 LBS | HEIGHT: 63 IN | BODY MASS INDEX: 32.43 KG/M2

## 2023-01-24 DIAGNOSIS — R79.89 OTHER SPECIFIED ABNORMAL FINDINGS OF BLOOD CHEMISTRY: ICD-10-CM

## 2023-01-24 DIAGNOSIS — Z32.01 ENCOUNTER FOR PREGNANCY TEST, RESULT POSITIVE: ICD-10-CM

## 2023-01-24 PROCEDURE — 99214 OFFICE O/P EST MOD 30 MIN: CPT

## 2023-01-24 PROCEDURE — 76817 TRANSVAGINAL US OBSTETRIC: CPT

## 2023-02-02 PROBLEM — R79.89 ELEVATED TESTOSTERONE LEVEL IN FEMALE: Status: RESOLVED | Noted: 2022-11-28 | Resolved: 2022-12-08

## 2023-02-02 LAB
ABO + RH PNL BLD: NORMAL
ALBUMIN SERPL ELPH-MCNC: 4.6 G/DL
ALP BLD-CCNC: 62 U/L
ALT SERPL-CCNC: 17 U/L
ANION GAP SERPL CALC-SCNC: 15 MMOL/L
AST SERPL-CCNC: 17 U/L
B19V IGG SER QL IA: 6.75 INDEX
B19V IGG+IGM SER-IMP: NORMAL
B19V IGG+IGM SER-IMP: POSITIVE
B19V IGM FLD-ACNC: 0.37 INDEX
B19V IGM SER-ACNC: NEGATIVE
BACTERIA UR CULT: NORMAL
BASOPHILS # BLD AUTO: 0.03 K/UL
BASOPHILS NFR BLD AUTO: 0.3 %
BILIRUB SERPL-MCNC: 0.3 MG/DL
BLD GP AB SCN SERPL QL: NORMAL
BUN SERPL-MCNC: 10 MG/DL
C TRACH RRNA SPEC QL NAA+PROBE: NOT DETECTED
CALCIUM SERPL-MCNC: 9.5 MG/DL
CHLORIDE SERPL-SCNC: 101 MMOL/L
CO2 SERPL-SCNC: 22 MMOL/L
CREAT SERPL-MCNC: 0.7 MG/DL
EGFR: 116 ML/MIN/1.73M2
EOSINOPHIL # BLD AUTO: 0.03 K/UL
EOSINOPHIL NFR BLD AUTO: 0.3 %
GLUCOSE SERPL-MCNC: 76 MG/DL
HBV SURFACE AG SER QL: NONREACTIVE
HCT VFR BLD CALC: 41.1 %
HCV AB SER QL: NONREACTIVE
HCV S/CO RATIO: 0.08 S/CO
HGB BLD-MCNC: 13.5 G/DL
HIV1+2 AB SPEC QL IA.RAPID: NONREACTIVE
IMM GRANULOCYTES NFR BLD AUTO: 0.3 %
LEAD BLD-MCNC: <1 UG/DL
LYMPHOCYTES # BLD AUTO: 1.78 K/UL
LYMPHOCYTES NFR BLD AUTO: 17.5 %
MAN DIFF?: NORMAL
MCHC RBC-ENTMCNC: 29.8 PG
MCHC RBC-ENTMCNC: 32.8 GM/DL
MCV RBC AUTO: 90.7 FL
MONOCYTES # BLD AUTO: 0.72 K/UL
MONOCYTES NFR BLD AUTO: 7.1 %
N GONORRHOEA RRNA SPEC QL NAA+PROBE: NOT DETECTED
NEUTROPHILS # BLD AUTO: 7.6 K/UL
NEUTROPHILS NFR BLD AUTO: 74.5 %
PLATELET # BLD AUTO: 297 K/UL
POTASSIUM SERPL-SCNC: 4 MMOL/L
PROT SERPL-MCNC: 7.3 G/DL
RBC # BLD: 4.53 M/UL
RBC # FLD: 13.3 %
RUBV IGG FLD-ACNC: 4.7 INDEX
RUBV IGG SER-IMP: POSITIVE
SODIUM SERPL-SCNC: 138 MMOL/L
SOURCE AMPLIFICATION: NORMAL
T PALLIDUM AB SER QL IA: NEGATIVE
TSH SERPL-ACNC: 2.27 UIU/ML
VZV AB TITR SER: POSITIVE
VZV IGG SER IF-ACNC: 2205 INDEX
WBC # FLD AUTO: 10.19 K/UL

## 2023-02-16 ENCOUNTER — APPOINTMENT (OUTPATIENT)
Dept: ANTEPARTUM | Facility: CLINIC | Age: 38
End: 2023-02-16
Payer: COMMERCIAL

## 2023-02-16 ENCOUNTER — ASOB RESULT (OUTPATIENT)
Age: 38
End: 2023-02-16

## 2023-02-16 ENCOUNTER — APPOINTMENT (OUTPATIENT)
Dept: OBGYN | Facility: CLINIC | Age: 38
End: 2023-02-16
Payer: COMMERCIAL

## 2023-02-16 VITALS
BODY MASS INDEX: 32.78 KG/M2 | DIASTOLIC BLOOD PRESSURE: 66 MMHG | HEIGHT: 63 IN | WEIGHT: 185 LBS | SYSTOLIC BLOOD PRESSURE: 104 MMHG

## 2023-02-16 DIAGNOSIS — O09.521 SUPERVISION OF ELDERLY MULTIGRAVIDA, FIRST TRIMESTER: ICD-10-CM

## 2023-02-16 PROCEDURE — 76813 OB US NUCHAL MEAS 1 GEST: CPT

## 2023-02-16 PROCEDURE — 0502F SUBSEQUENT PRENATAL CARE: CPT

## 2023-02-16 PROCEDURE — 76801 OB US < 14 WKS SINGLE FETUS: CPT | Mod: 59

## 2023-03-15 ENCOUNTER — APPOINTMENT (OUTPATIENT)
Dept: OBGYN | Facility: CLINIC | Age: 38
End: 2023-03-15
Payer: COMMERCIAL

## 2023-03-15 VITALS
SYSTOLIC BLOOD PRESSURE: 110 MMHG | BODY MASS INDEX: 32.96 KG/M2 | HEIGHT: 63 IN | WEIGHT: 186 LBS | HEART RATE: 82 BPM | DIASTOLIC BLOOD PRESSURE: 72 MMHG

## 2023-03-15 DIAGNOSIS — Z34.92 ENCOUNTER FOR SUPERVISION OF NORMAL PREGNANCY, UNSPECIFIED, SECOND TRIMESTER: ICD-10-CM

## 2023-03-15 PROCEDURE — 0502F SUBSEQUENT PRENATAL CARE: CPT

## 2023-03-21 LAB
AFP MOM: 0.55
AFP VALUE: 16.4 NG/ML
ALPHA FETOPROTEIN SERUM COMMENT: NORMAL
ALPHA FETOPROTEIN SERUM INTERPRETATION: NORMAL
ALPHA FETOPROTEIN SERUM RESULTS: NORMAL
ALPHA FETOPROTEIN SERUM TEST RESULTS: NORMAL
GESTATIONAL AGE BASED ON: NORMAL
GESTATIONAL AGE ON COLLECTION DATE: 15.9 WEEKS
INSULIN DEP DIABETES: NO
MATERNAL AGE AT EDD AFP: 35.8 YR
MULTIPLE GESTATION: NO
OSBR RISK 1 IN: NORMAL
RACE: NORMAL
WEIGHT AFP: 186 LBS

## 2023-04-17 ENCOUNTER — APPOINTMENT (OUTPATIENT)
Dept: ANTEPARTUM | Facility: CLINIC | Age: 38
End: 2023-04-17
Payer: COMMERCIAL

## 2023-04-17 ENCOUNTER — ASOB RESULT (OUTPATIENT)
Age: 38
End: 2023-04-17

## 2023-04-17 ENCOUNTER — NON-APPOINTMENT (OUTPATIENT)
Age: 38
End: 2023-04-17

## 2023-04-17 PROCEDURE — 76811 OB US DETAILED SNGL FETUS: CPT

## 2023-04-21 ENCOUNTER — APPOINTMENT (OUTPATIENT)
Dept: OBGYN | Facility: CLINIC | Age: 38
End: 2023-04-21
Payer: COMMERCIAL

## 2023-04-21 VITALS
SYSTOLIC BLOOD PRESSURE: 100 MMHG | DIASTOLIC BLOOD PRESSURE: 68 MMHG | WEIGHT: 187 LBS | HEIGHT: 63 IN | BODY MASS INDEX: 33.13 KG/M2 | HEART RATE: 78 BPM

## 2023-04-21 PROCEDURE — 0502F SUBSEQUENT PRENATAL CARE: CPT

## 2023-04-21 RX ORDER — ASPIRIN 81 MG
81 TABLET, DELAYED RELEASE (ENTERIC COATED) ORAL
Refills: 0 | Status: ACTIVE | COMMUNITY

## 2023-05-16 ENCOUNTER — APPOINTMENT (OUTPATIENT)
Dept: OBGYN | Facility: CLINIC | Age: 38
End: 2023-05-16
Payer: COMMERCIAL

## 2023-05-16 VITALS
HEIGHT: 63 IN | WEIGHT: 192 LBS | HEART RATE: 77 BPM | BODY MASS INDEX: 34.02 KG/M2 | SYSTOLIC BLOOD PRESSURE: 109 MMHG | DIASTOLIC BLOOD PRESSURE: 71 MMHG

## 2023-05-16 DIAGNOSIS — O09.522 SUPERVISION OF ELDERLY MULTIGRAVIDA, SECOND TRIMESTER: ICD-10-CM

## 2023-05-16 PROCEDURE — 0502F SUBSEQUENT PRENATAL CARE: CPT

## 2023-05-17 LAB
BASOPHILS # BLD AUTO: 0.04 K/UL
BASOPHILS NFR BLD AUTO: 0.3 %
EOSINOPHIL # BLD AUTO: 0.05 K/UL
EOSINOPHIL NFR BLD AUTO: 0.4 %
GLUCOSE 1H P 50 G GLC PO SERPL-MCNC: 102 MG/DL
HCT VFR BLD CALC: 36.5 %
HGB BLD-MCNC: 11.8 G/DL
IMM GRANULOCYTES NFR BLD AUTO: 0.3 %
LYMPHOCYTES # BLD AUTO: 1.65 K/UL
LYMPHOCYTES NFR BLD AUTO: 14.3 %
MAN DIFF?: NORMAL
MCHC RBC-ENTMCNC: 32.3 GM/DL
MCHC RBC-ENTMCNC: 32.5 PG
MCV RBC AUTO: 100.6 FL
MONOCYTES # BLD AUTO: 0.58 K/UL
MONOCYTES NFR BLD AUTO: 5 %
NEUTROPHILS # BLD AUTO: 9.19 K/UL
NEUTROPHILS NFR BLD AUTO: 79.7 %
PLATELET # BLD AUTO: 233 K/UL
RBC # BLD: 3.63 M/UL
RBC # FLD: 14.5 %
WBC # FLD AUTO: 11.54 K/UL

## 2023-06-04 LAB
BACTERIA UR CULT: NORMAL
T PALLIDUM AB SER QL IA: NEGATIVE

## 2023-06-28 ENCOUNTER — NON-APPOINTMENT (OUTPATIENT)
Age: 38
End: 2023-06-28

## 2023-06-29 ENCOUNTER — APPOINTMENT (OUTPATIENT)
Dept: OBGYN | Facility: CLINIC | Age: 38
End: 2023-06-29
Payer: COMMERCIAL

## 2023-06-29 ENCOUNTER — APPOINTMENT (OUTPATIENT)
Dept: ANTEPARTUM | Facility: CLINIC | Age: 38
End: 2023-06-29
Payer: COMMERCIAL

## 2023-06-29 ENCOUNTER — ASOB RESULT (OUTPATIENT)
Age: 38
End: 2023-06-29

## 2023-06-29 VITALS
HEIGHT: 63 IN | DIASTOLIC BLOOD PRESSURE: 67 MMHG | SYSTOLIC BLOOD PRESSURE: 106 MMHG | WEIGHT: 198 LBS | BODY MASS INDEX: 35.08 KG/M2 | TEMPERATURE: 98 F | HEART RATE: 89 BPM

## 2023-06-29 DIAGNOSIS — L29.2 PRURITUS VULVAE: ICD-10-CM

## 2023-06-29 PROCEDURE — 76819 FETAL BIOPHYS PROFIL W/O NST: CPT | Mod: 59

## 2023-06-29 PROCEDURE — 0502F SUBSEQUENT PRENATAL CARE: CPT

## 2023-06-29 PROCEDURE — 76816 OB US FOLLOW-UP PER FETUS: CPT

## 2023-06-29 RX ORDER — TERCONAZOLE 8 MG/G
0.8 CREAM VAGINAL
Qty: 1 | Refills: 2 | Status: ACTIVE | COMMUNITY
Start: 2023-06-29 | End: 1900-01-01

## 2023-07-11 ENCOUNTER — APPOINTMENT (OUTPATIENT)
Dept: OBGYN | Facility: CLINIC | Age: 38
End: 2023-07-11
Payer: COMMERCIAL

## 2023-07-11 VITALS
HEIGHT: 63 IN | HEART RATE: 96 BPM | WEIGHT: 201 LBS | BODY MASS INDEX: 35.61 KG/M2 | SYSTOLIC BLOOD PRESSURE: 110 MMHG | DIASTOLIC BLOOD PRESSURE: 72 MMHG

## 2023-07-11 PROCEDURE — 0502F SUBSEQUENT PRENATAL CARE: CPT

## 2023-07-12 ENCOUNTER — APPOINTMENT (OUTPATIENT)
Dept: ANTEPARTUM | Facility: CLINIC | Age: 38
End: 2023-07-12

## 2023-07-25 ENCOUNTER — APPOINTMENT (OUTPATIENT)
Dept: OBGYN | Facility: CLINIC | Age: 38
End: 2023-07-25
Payer: COMMERCIAL

## 2023-07-25 VITALS
HEART RATE: 85 BPM | BODY MASS INDEX: 36.14 KG/M2 | DIASTOLIC BLOOD PRESSURE: 69 MMHG | SYSTOLIC BLOOD PRESSURE: 104 MMHG | HEIGHT: 63 IN | WEIGHT: 204 LBS

## 2023-07-25 PROCEDURE — 0502F SUBSEQUENT PRENATAL CARE: CPT

## 2023-07-25 PROCEDURE — 90715 TDAP VACCINE 7 YRS/> IM: CPT

## 2023-07-25 PROCEDURE — 90471 IMMUNIZATION ADMIN: CPT

## 2023-08-02 NOTE — DISCHARGE NOTE PROVIDER - NSDCQMCOGNITION_NEU_ALL_CORE
Stable, well controlled off medication, follow up annually, sooner if change in mood   No difficulties

## 2023-08-08 ENCOUNTER — NON-APPOINTMENT (OUTPATIENT)
Age: 38
End: 2023-08-08

## 2023-08-08 ENCOUNTER — APPOINTMENT (OUTPATIENT)
Dept: OBGYN | Facility: CLINIC | Age: 38
End: 2023-08-08
Payer: COMMERCIAL

## 2023-08-08 VITALS
SYSTOLIC BLOOD PRESSURE: 116 MMHG | WEIGHT: 202 LBS | DIASTOLIC BLOOD PRESSURE: 83 MMHG | HEIGHT: 63 IN | BODY MASS INDEX: 35.79 KG/M2 | HEART RATE: 78 BPM

## 2023-08-08 DIAGNOSIS — N64.59 OTHER SIGNS AND SYMPTOMS IN BREAST: ICD-10-CM

## 2023-08-08 PROCEDURE — 0502F SUBSEQUENT PRENATAL CARE: CPT

## 2023-08-15 ENCOUNTER — APPOINTMENT (OUTPATIENT)
Dept: OBGYN | Facility: CLINIC | Age: 38
End: 2023-08-15
Payer: COMMERCIAL

## 2023-08-15 VITALS
DIASTOLIC BLOOD PRESSURE: 72 MMHG | SYSTOLIC BLOOD PRESSURE: 113 MMHG | HEART RATE: 78 BPM | BODY MASS INDEX: 37.03 KG/M2 | HEIGHT: 63 IN | WEIGHT: 209 LBS

## 2023-08-15 LAB
GP B STREP DNA SPEC QL NAA+PROBE: NOT DETECTED
HIV1+2 AB SPEC QL IA.RAPID: NONREACTIVE
SOURCE GBS: NORMAL

## 2023-08-15 PROCEDURE — 0502F SUBSEQUENT PRENATAL CARE: CPT

## 2023-08-22 ENCOUNTER — NON-APPOINTMENT (OUTPATIENT)
Age: 38
End: 2023-08-22

## 2023-08-22 ENCOUNTER — APPOINTMENT (OUTPATIENT)
Dept: OBGYN | Facility: CLINIC | Age: 38
End: 2023-08-22
Payer: COMMERCIAL

## 2023-08-22 VITALS
SYSTOLIC BLOOD PRESSURE: 109 MMHG | WEIGHT: 209 LBS | BODY MASS INDEX: 37.03 KG/M2 | DIASTOLIC BLOOD PRESSURE: 78 MMHG | HEART RATE: 78 BPM | HEIGHT: 63 IN

## 2023-08-22 PROCEDURE — 0502F SUBSEQUENT PRENATAL CARE: CPT

## 2023-08-24 ENCOUNTER — OUTPATIENT (OUTPATIENT)
Dept: OUTPATIENT SERVICES | Facility: HOSPITAL | Age: 38
LOS: 1 days | End: 2023-08-24
Payer: COMMERCIAL

## 2023-08-24 ENCOUNTER — RESULT REVIEW (OUTPATIENT)
Age: 38
End: 2023-08-24

## 2023-08-24 ENCOUNTER — APPOINTMENT (OUTPATIENT)
Dept: ULTRASOUND IMAGING | Facility: CLINIC | Age: 38
End: 2023-08-24
Payer: COMMERCIAL

## 2023-08-24 DIAGNOSIS — Z98.890 OTHER SPECIFIED POSTPROCEDURAL STATES: Chronic | ICD-10-CM

## 2023-08-24 DIAGNOSIS — N64.59 OTHER SIGNS AND SYMPTOMS IN BREAST: ICD-10-CM

## 2023-08-24 PROCEDURE — 76642 ULTRASOUND BREAST LIMITED: CPT

## 2023-08-24 PROCEDURE — 76642 ULTRASOUND BREAST LIMITED: CPT | Mod: 26,LT

## 2023-08-29 ENCOUNTER — NON-APPOINTMENT (OUTPATIENT)
Age: 38
End: 2023-08-29

## 2023-08-29 ENCOUNTER — APPOINTMENT (OUTPATIENT)
Dept: OBGYN | Facility: CLINIC | Age: 38
End: 2023-08-29
Payer: COMMERCIAL

## 2023-08-29 VITALS
HEART RATE: 86 BPM | SYSTOLIC BLOOD PRESSURE: 116 MMHG | DIASTOLIC BLOOD PRESSURE: 77 MMHG | HEIGHT: 63 IN | WEIGHT: 210 LBS | BODY MASS INDEX: 37.21 KG/M2

## 2023-08-29 DIAGNOSIS — B35.1 TINEA UNGUIUM: ICD-10-CM

## 2023-08-29 DIAGNOSIS — O09.523 SUPERVISION OF ELDERLY MULTIGRAVIDA, THIRD TRIMESTER: ICD-10-CM

## 2023-08-29 PROCEDURE — 0502F SUBSEQUENT PRENATAL CARE: CPT

## 2023-08-29 RX ORDER — NYSTATIN 100000 U/G
100000 OINTMENT TOPICAL 3 TIMES DAILY
Qty: 30 | Refills: 2 | Status: ACTIVE | COMMUNITY
Start: 2023-08-29 | End: 1900-01-01

## 2023-08-30 ENCOUNTER — TRANSCRIPTION ENCOUNTER (OUTPATIENT)
Age: 38
End: 2023-08-30

## 2023-08-30 ENCOUNTER — INPATIENT (INPATIENT)
Facility: HOSPITAL | Age: 38
LOS: 1 days | Discharge: HOME | End: 2023-09-01
Attending: OBSTETRICS & GYNECOLOGY | Admitting: OBSTETRICS & GYNECOLOGY
Payer: COMMERCIAL

## 2023-08-30 VITALS — TEMPERATURE: 99 F

## 2023-08-30 DIAGNOSIS — Z98.890 OTHER SPECIFIED POSTPROCEDURAL STATES: Chronic | ICD-10-CM

## 2023-08-30 DIAGNOSIS — O42.90 PREMATURE RUPTURE OF MEMBRANES, UNSPECIFIED AS TO LENGTH OF TIME BETWEEN RUPTURE AND ONSET OF LABOR, UNSPECIFIED WEEKS OF GESTATION: ICD-10-CM

## 2023-08-30 DIAGNOSIS — O26.899 OTHER SPECIFIED PREGNANCY RELATED CONDITIONS, UNSPECIFIED TRIMESTER: ICD-10-CM

## 2023-08-30 LAB
BASOPHILS # BLD AUTO: 0.02 K/UL — SIGNIFICANT CHANGE UP (ref 0–0.2)
BASOPHILS NFR BLD AUTO: 0.2 % — SIGNIFICANT CHANGE UP (ref 0–2)
BLD GP AB SCN SERPL QL: NEGATIVE — SIGNIFICANT CHANGE UP
EOSINOPHIL # BLD AUTO: 0.08 K/UL — SIGNIFICANT CHANGE UP (ref 0–0.5)
EOSINOPHIL NFR BLD AUTO: 0.7 % — SIGNIFICANT CHANGE UP (ref 0–6)
HCT VFR BLD CALC: 36 % — SIGNIFICANT CHANGE UP (ref 34.5–45)
HGB BLD-MCNC: 12.5 G/DL — SIGNIFICANT CHANGE UP (ref 11.5–15.5)
IANC: 7.32 K/UL — SIGNIFICANT CHANGE UP (ref 1.8–7.4)
IMM GRANULOCYTES NFR BLD AUTO: 0.5 % — SIGNIFICANT CHANGE UP (ref 0–0.9)
LYMPHOCYTES # BLD AUTO: 2.59 K/UL — SIGNIFICANT CHANGE UP (ref 1–3.3)
LYMPHOCYTES # BLD AUTO: 23.7 % — SIGNIFICANT CHANGE UP (ref 13–44)
MCHC RBC-ENTMCNC: 33 PG — SIGNIFICANT CHANGE UP (ref 27–34)
MCHC RBC-ENTMCNC: 34.7 GM/DL — SIGNIFICANT CHANGE UP (ref 32–36)
MCV RBC AUTO: 95 FL — SIGNIFICANT CHANGE UP (ref 80–100)
MONOCYTES # BLD AUTO: 0.89 K/UL — SIGNIFICANT CHANGE UP (ref 0–0.9)
MONOCYTES NFR BLD AUTO: 8.1 % — SIGNIFICANT CHANGE UP (ref 2–14)
NEUTROPHILS # BLD AUTO: 7.32 K/UL — SIGNIFICANT CHANGE UP (ref 1.8–7.4)
NEUTROPHILS NFR BLD AUTO: 66.8 % — SIGNIFICANT CHANGE UP (ref 43–77)
NRBC # BLD: 0 /100 WBCS — SIGNIFICANT CHANGE UP (ref 0–0)
NRBC # FLD: 0 K/UL — SIGNIFICANT CHANGE UP (ref 0–0)
PLATELET # BLD AUTO: 212 K/UL — SIGNIFICANT CHANGE UP (ref 150–400)
RBC # BLD: 3.79 M/UL — LOW (ref 3.8–5.2)
RBC # FLD: 12.7 % — SIGNIFICANT CHANGE UP (ref 10.3–14.5)
RH IG SCN BLD-IMP: POSITIVE — SIGNIFICANT CHANGE UP
RH IG SCN BLD-IMP: POSITIVE — SIGNIFICANT CHANGE UP
WBC # BLD: 10.95 K/UL — HIGH (ref 3.8–10.5)
WBC # FLD AUTO: 10.95 K/UL — HIGH (ref 3.8–10.5)

## 2023-08-30 PROCEDURE — 59410 OBSTETRICAL CARE: CPT

## 2023-08-30 RX ORDER — SODIUM CHLORIDE 9 MG/ML
3 INJECTION INTRAMUSCULAR; INTRAVENOUS; SUBCUTANEOUS EVERY 8 HOURS
Refills: 0 | Status: DISCONTINUED | OUTPATIENT
Start: 2023-08-30 | End: 2023-09-01

## 2023-08-30 RX ORDER — TETANUS TOXOID, REDUCED DIPHTHERIA TOXOID AND ACELLULAR PERTUSSIS VACCINE, ADSORBED 5; 2.5; 8; 8; 2.5 [IU]/.5ML; [IU]/.5ML; UG/.5ML; UG/.5ML; UG/.5ML
0.5 SUSPENSION INTRAMUSCULAR ONCE
Refills: 0 | Status: DISCONTINUED | OUTPATIENT
Start: 2023-08-30 | End: 2023-09-01

## 2023-08-30 RX ORDER — ACETAMINOPHEN 500 MG
975 TABLET ORAL
Refills: 0 | Status: DISCONTINUED | OUTPATIENT
Start: 2023-08-30 | End: 2023-09-01

## 2023-08-30 RX ORDER — OXYCODONE HYDROCHLORIDE 5 MG/1
5 TABLET ORAL
Refills: 0 | Status: DISCONTINUED | OUTPATIENT
Start: 2023-08-30 | End: 2023-09-01

## 2023-08-30 RX ORDER — CHLORHEXIDINE GLUCONATE 213 G/1000ML
1 SOLUTION TOPICAL DAILY
Refills: 0 | Status: DISCONTINUED | OUTPATIENT
Start: 2023-08-30 | End: 2023-08-31

## 2023-08-30 RX ORDER — IBUPROFEN 200 MG
1 TABLET ORAL
Qty: 0 | Refills: 0 | DISCHARGE
Start: 2023-08-30

## 2023-08-30 RX ORDER — OXYCODONE HYDROCHLORIDE 5 MG/1
5 TABLET ORAL ONCE
Refills: 0 | Status: DISCONTINUED | OUTPATIENT
Start: 2023-08-30 | End: 2023-09-01

## 2023-08-30 RX ORDER — AER TRAVELER 0.5 G/1
1 SOLUTION RECTAL; TOPICAL EVERY 4 HOURS
Refills: 0 | Status: DISCONTINUED | OUTPATIENT
Start: 2023-08-30 | End: 2023-09-01

## 2023-08-30 RX ORDER — HYDROCORTISONE 1 %
1 OINTMENT (GRAM) TOPICAL EVERY 6 HOURS
Refills: 0 | Status: DISCONTINUED | OUTPATIENT
Start: 2023-08-30 | End: 2023-09-01

## 2023-08-30 RX ORDER — SIMETHICONE 80 MG/1
80 TABLET, CHEWABLE ORAL EVERY 4 HOURS
Refills: 0 | Status: DISCONTINUED | OUTPATIENT
Start: 2023-08-30 | End: 2023-09-01

## 2023-08-30 RX ORDER — DIBUCAINE 1 %
1 OINTMENT (GRAM) RECTAL EVERY 6 HOURS
Refills: 0 | Status: DISCONTINUED | OUTPATIENT
Start: 2023-08-30 | End: 2023-09-01

## 2023-08-30 RX ORDER — ACETAMINOPHEN 500 MG
3 TABLET ORAL
Qty: 0 | Refills: 0 | DISCHARGE
Start: 2023-08-30

## 2023-08-30 RX ORDER — OXYTOCIN 10 UNIT/ML
41.67 VIAL (ML) INJECTION
Qty: 20 | Refills: 0 | Status: DISCONTINUED | OUTPATIENT
Start: 2023-08-30 | End: 2023-08-31

## 2023-08-30 RX ORDER — KETOROLAC TROMETHAMINE 30 MG/ML
30 SYRINGE (ML) INJECTION ONCE
Refills: 0 | Status: DISCONTINUED | OUTPATIENT
Start: 2023-08-30 | End: 2023-08-31

## 2023-08-30 RX ORDER — LANOLIN
1 OINTMENT (GRAM) TOPICAL EVERY 6 HOURS
Refills: 0 | Status: DISCONTINUED | OUTPATIENT
Start: 2023-08-30 | End: 2023-09-01

## 2023-08-30 RX ORDER — SODIUM CHLORIDE 9 MG/ML
1000 INJECTION, SOLUTION INTRAVENOUS
Refills: 0 | Status: DISCONTINUED | OUTPATIENT
Start: 2023-08-30 | End: 2023-08-31

## 2023-08-30 RX ORDER — MAGNESIUM HYDROXIDE 400 MG/1
30 TABLET, CHEWABLE ORAL
Refills: 0 | Status: DISCONTINUED | OUTPATIENT
Start: 2023-08-30 | End: 2023-09-01

## 2023-08-30 RX ORDER — BENZOCAINE 10 %
1 GEL (GRAM) MUCOUS MEMBRANE EVERY 6 HOURS
Refills: 0 | Status: DISCONTINUED | OUTPATIENT
Start: 2023-08-30 | End: 2023-09-01

## 2023-08-30 RX ORDER — OXYTOCIN 10 UNIT/ML
333.33 VIAL (ML) INJECTION
Qty: 20 | Refills: 0 | Status: DISCONTINUED | OUTPATIENT
Start: 2023-08-30 | End: 2023-08-31

## 2023-08-30 RX ORDER — PRAMOXINE HYDROCHLORIDE 150 MG/15G
1 AEROSOL, FOAM RECTAL EVERY 4 HOURS
Refills: 0 | Status: DISCONTINUED | OUTPATIENT
Start: 2023-08-30 | End: 2023-09-01

## 2023-08-30 RX ORDER — IBUPROFEN 200 MG
600 TABLET ORAL EVERY 6 HOURS
Refills: 0 | Status: COMPLETED | OUTPATIENT
Start: 2023-08-30 | End: 2024-07-28

## 2023-08-30 RX ORDER — DIPHENHYDRAMINE HCL 50 MG
25 CAPSULE ORAL EVERY 6 HOURS
Refills: 0 | Status: DISCONTINUED | OUTPATIENT
Start: 2023-08-30 | End: 2023-09-01

## 2023-08-30 RX ORDER — CITRIC ACID/SODIUM CITRATE 300-500 MG
15 SOLUTION, ORAL ORAL EVERY 6 HOURS
Refills: 0 | Status: DISCONTINUED | OUTPATIENT
Start: 2023-08-30 | End: 2023-08-31

## 2023-08-30 NOTE — OB PROVIDER H&P - NSHPPHYSICALEXAM_GEN_ALL_CORE
T(C): 37.2 (08-30-23 @ 19:17), Max: 37.2 (08-30-23 @ 19:11)  HR: 76 (08-30-23 @ 19:17) (76 - 76)  BP: 138/67 (08-30-23 @ 19:17) (138/67 - 138/67)  RR: 18 (08-30-23 @ 19:17) (18 - 18)  SpO2: --  – PE:   CV: RRR  Pulm: breathing comfortably on RA  Abd: soft, gravid, nontender  Extr: moving all extremities with ease  TAUS: images saved in ASOB, vertex position, anterior placenta, M mode: 140 FHR, MVP: 2  NST: baseline 130 FHR, moderate variability, + accels, - decels, reactive

## 2023-08-30 NOTE — OB PROVIDER DELIVERY SUMMARY - NSPROVIDERDELIVERYNOTE_OBGYN_ALL_OB_FT
Spontaneous vaginal delivery of liveborn infant from OTIS position. Head, shoulders, and body delivered easily. Infant was suctioned. No mec. Delayed cord clamping and infant was passed to mother. Cord clamped and cut. Placenta delivered intact with a 3-vessel cord. Bimanual massage was given and uterine fundus and SELINA were found to be firm. Vaginal exam revealed an intact cervix, vaginal walls and sulci. Patient had a 2nd degree laceration in the perineum that was repaired with 2.0 chromic suture. Excellent hemostasis was noted. Patient was stable and went to recovery. Count was correct x 2.    Bella Galicia PGY4

## 2023-08-30 NOTE — OB PROVIDER H&P - NSLOWPPHRISK_OBGYN_A_OB
06-Oct-2018 15:58
No previous uterine incision/Early Pregnancy/Less than or equal to 4 previous vaginal births/No known bleeding disorder/No history of postpartum hemorrhage/No other PPH risks indicated

## 2023-08-30 NOTE — OB RN DELIVERY SUMMARY - NS_BREASTINHOURA_OBGYN_ALL_OB
Offered, declined by mother Offered, attempted but was not successful Offered, feeding was successful

## 2023-08-30 NOTE — OB PROVIDER H&P - HISTORY OF PRESENT ILLNESS
37y  EGA@ 39.6 weeks, presents for gush of clear fluid at 6:30 pm and ctx. Patient reports  + fetal movements, denies vaginal bleeding. Pt denies any other concerns.  Antepartum course is significant for:  - GBS negative  - History of HPV

## 2023-08-30 NOTE — OB PROVIDER LABOR PROGRESS NOTE - NS_SUBJECTIVE/OBJECTIVE_OBGYN_ALL_OB_FT
S:  Pt seen and examined for prolonged deceleration.    O:  Vital Signs Last 24 Hrs  T(C): 36.5 (30 Aug 2023 20:17), Max: 37.2 (30 Aug 2023 19:11)  T(F): 97.7 (30 Aug 2023 20:17), Max: 99 (30 Aug 2023 19:17)  HR: 98 (30 Aug 2023 21:24) (65 - 111)  BP: 132/67 (30 Aug 2023 21:24) (102/55 - 138/67)  BP(mean): --  RR: 18 (30 Aug 2023 20:17) (18 - 18)  SpO2: 87% (30 Aug 2023 21:22) (73% - 100%)    Parameters below as of 30 Aug 2023 19:17  Patient On (Oxygen Delivery Method): room air

## 2023-08-30 NOTE — OB PROVIDER LABOR PROGRESS NOTE - NS_DILATION_OBGYN_ALL_OB_NU
10
What Type Of Note Output Would You Prefer (Optional)?: Standard Output
Is The Patient Presenting As Previously Scheduled?: Yes
How Severe Is Your Rash?: mild
Is This A New Presentation, Or A Follow-Up?: Rash

## 2023-08-30 NOTE — OB RN TRIAGE NOTE - FALL HARM RISK - UNIVERSAL INTERVENTIONS
Bed in lowest position, wheels locked, appropriate side rails in place/Call bell, personal items and telephone in reach/Instruct patient to call for assistance before getting out of bed or chair/Non-slip footwear when patient is out of bed/[place] to call system/Physically safe environment - no spills, clutter or unnecessary equipment/Purposeful Proactive Rounding/Room/bathroom lighting operational, light cord in reach

## 2023-08-30 NOTE — OB PROVIDER LABOR PROGRESS NOTE - ASSESSMENT
Pt is a 36yo  admitted for labor now fully dilated.    - Continue cont EFM, toco, IVF  - Expectant management    D/w Dr. Trip Skinner MD PGY1

## 2023-08-30 NOTE — OB PROVIDER H&P - ASSESSMENT
37y female  @39.6w, admit for PROM at 6:30 pm.    - Admit to Labor and Delivery  - Continuous EFM  - Clear diet, IV fluids  - Consent signed  - Standard labs (T&S, CBC, RPR, COVID serology)  - Epidural PRN  - Expectant management  - Discussed with Dr. Dominique

## 2023-08-30 NOTE — OB PROVIDER DELIVERY SUMMARY - NSSELHIDDEN_OBGYN_ALL_OB_FT
[NS_DeliveryAttending1_OBGYN_ALL_OB_FT:[id number]=],[NS_DeliveryRN_OBGYN_ALL_OB_FT:[id number]=]

## 2023-08-30 NOTE — DISCHARGE NOTE OB - CARE PROVIDER_API CALL
Swathi William  Obstetrics and Gynecology  [address] [place] [address]  Phone: [phone]  Fax: [phone]  Follow Up Time:

## 2023-08-30 NOTE — OB RN DELIVERY SUMMARY - NS_SEPSISRSKCALC_OBGYN_ALL_OB_FT
EOS calculated successfully. EOS Risk Factor: 0.5/1000 live births ([facility] national incidence); GA=39w6d; Temp=99; ROM=3.833; GBS='Negative'; Antibiotics='No antibiotics or any antibiotics < 2 hrs prior to birth'

## 2023-08-30 NOTE — OB RN DELIVERY SUMMARY - NS_RNDELIVATTEST_OBGYN_ALL_OB
English
OB Provider reported that the vagina was inspected and no foreign body was found/Laps, needles and instrument count was correct

## 2023-08-30 NOTE — OB RN DELIVERY SUMMARY - NSSELHIDDEN_OBGYN_ALL_OB_FT
[NS_DeliveryAttending1_OBGYN_ALL_OB_FT:[id number]=],[NS_DeliveryRN_OBGYN_ALL_OB_FT:[id number]=]

## 2023-08-30 NOTE — DISCHARGE NOTE OB - PATIENT PORTAL LINK FT
You can access the FollowMyHealth Patient Portal offered by [facility] by registering at the following website: [email or web address]. By joining [employer]’s FollowMyHealth portal, you will also be able to view your health information using other applications (apps) compatible with our system.

## 2023-08-30 NOTE — OB PROVIDER DELIVERY SUMMARY - NSLOWPPHRISK_OBGYN_A_OB
No previous uterine incision/Early Pregnancy/Less than or equal to 4 previous vaginal births/No known bleeding disorder/No history of postpartum hemorrhage/No other PPH risks indicated

## 2023-08-30 NOTE — DISCHARGE NOTE OB - MEDICATION SUMMARY - MEDICATIONS TO TAKE
I will START or STAY ON the medications listed below when I get home from the hospital:    ibuprofen 600 mg oral tablet  -- 1 tab(s) by mouth every 6 hours  -- Indication: For Pain    acetaminophen 325 mg oral tablet  -- 3 tab(s) by mouth every 6 hours  -- Indication: For Pain    Prenatal Multivitamins with Folic Acid 1 mg oral tablet  -- 1 tab(s) by mouth once a day  -- Indication: For PRenatal    I will START or STAY ON the medications listed below when I get home from the hospital:    ibuprofen 600 mg oral tablet  -- 1 tab(s) by mouth every 6 hours as needed for  moderate pain  -- Indication: For Pain    acetaminophen 325 mg oral tablet  -- 3 tab(s) by mouth every 6 hours as needed for  mild pain  -- Indication: For Pain    Prenatal Multivitamins with Folic Acid 1 mg oral tablet  -- 1 tab(s) by mouth once a day  -- Indication: For Vitamins

## 2023-08-30 NOTE — DISCHARGE NOTE OB - NS MD DC FALL RISK RISK
For information on Fall & Injury Prevention, visit: [email or web address] OR  [email or web address] OR  https://www.cdc.gov/steadi/patient.html

## 2023-08-31 LAB — T PALLIDUM AB TITR SER: NEGATIVE — SIGNIFICANT CHANGE UP

## 2023-08-31 RX ORDER — IBUPROFEN 200 MG
600 TABLET ORAL EVERY 6 HOURS
Refills: 0 | Status: DISCONTINUED | OUTPATIENT
Start: 2023-08-31 | End: 2023-09-01

## 2023-08-31 RX ADMIN — Medication 600 MILLIGRAM(S): at 12:54

## 2023-08-31 RX ADMIN — SODIUM CHLORIDE 3 MILLILITER(S): 9 INJECTION INTRAMUSCULAR; INTRAVENOUS; SUBCUTANEOUS at 05:45

## 2023-08-31 RX ADMIN — Medication 975 MILLIGRAM(S): at 09:45

## 2023-08-31 RX ADMIN — Medication 975 MILLIGRAM(S): at 21:44

## 2023-08-31 RX ADMIN — Medication 600 MILLIGRAM(S): at 05:14

## 2023-08-31 RX ADMIN — Medication 1 TABLET(S): at 12:53

## 2023-08-31 RX ADMIN — Medication 975 MILLIGRAM(S): at 14:59

## 2023-08-31 RX ADMIN — Medication 975 MILLIGRAM(S): at 03:11

## 2023-08-31 RX ADMIN — Medication 975 MILLIGRAM(S): at 10:38

## 2023-08-31 RX ADMIN — SODIUM CHLORIDE 3 MILLILITER(S): 9 INJECTION INTRAMUSCULAR; INTRAVENOUS; SUBCUTANEOUS at 22:00

## 2023-08-31 RX ADMIN — SODIUM CHLORIDE 3 MILLILITER(S): 9 INJECTION INTRAMUSCULAR; INTRAVENOUS; SUBCUTANEOUS at 14:43

## 2023-08-31 RX ADMIN — Medication 975 MILLIGRAM(S): at 04:11

## 2023-08-31 RX ADMIN — Medication 975 MILLIGRAM(S): at 15:43

## 2023-08-31 RX ADMIN — Medication 975 MILLIGRAM(S): at 22:00

## 2023-08-31 RX ADMIN — Medication 30 MILLIGRAM(S): at 00:44

## 2023-08-31 RX ADMIN — Medication 30 MILLIGRAM(S): at 00:18

## 2023-08-31 RX ADMIN — Medication 600 MILLIGRAM(S): at 13:43

## 2023-08-31 RX ADMIN — Medication 600 MILLIGRAM(S): at 18:10

## 2023-09-01 VITALS
SYSTOLIC BLOOD PRESSURE: 126 MMHG | RESPIRATION RATE: 18 BRPM | HEART RATE: 78 BPM | DIASTOLIC BLOOD PRESSURE: 81 MMHG | TEMPERATURE: 98 F | OXYGEN SATURATION: 100 %

## 2023-09-01 RX ADMIN — SODIUM CHLORIDE 3 MILLILITER(S): 9 INJECTION INTRAMUSCULAR; INTRAVENOUS; SUBCUTANEOUS at 04:44

## 2023-09-01 RX ADMIN — Medication 600 MILLIGRAM(S): at 10:15

## 2023-09-01 RX ADMIN — Medication 975 MILLIGRAM(S): at 05:12

## 2023-09-01 RX ADMIN — Medication 600 MILLIGRAM(S): at 09:25

## 2023-09-01 RX ADMIN — Medication 600 MILLIGRAM(S): at 16:30

## 2023-09-01 RX ADMIN — Medication 600 MILLIGRAM(S): at 01:01

## 2023-09-01 RX ADMIN — Medication 600 MILLIGRAM(S): at 15:48

## 2023-09-01 RX ADMIN — Medication 1 TABLET(S): at 09:25

## 2023-09-01 NOTE — PROGRESS NOTE ADULT - SUBJECTIVE AND OBJECTIVE BOX
: Patient doing well. Minimal lochia. Pain controlled. Mostly bottle feeding  O: Vital Signs Last 24 Hrs  T(C): 36.4 (01 Sep 2023 06:02), Max: 36.7 (31 Aug 2023 18:33)  T(F): 97.6 (01 Sep 2023 06:02), Max: 98 (31 Aug 2023 18:33)  HR: 78 (01 Sep 2023 06:02) (78 - 78)  BP: 122/59 (01 Sep 2023 06:02) (122/59 - 126/82)  BP(mean): --  RR: 16 (01 Sep 2023 06:02) (16 - 16)  SpO2: 98% (01 Sep 2023 06:02) (98% - 98%)    Parameters below as of 01 Sep 2023 06:02  Patient On (Oxygen Delivery Method): room air        Gen: NAD  Abd: soft, NT, ND, fundus firm below umbilicus  Lochia: moderate  Ext: no tenderness    Labs:                        12.5   10.95 )-----------( 212      ( 30 Aug 2023 20:00 )             36.0       A: 37y PPD#2 s/p  doing well.     Plan: Continue routine postpartum care. Anticipate d/c home today
postpartum day 1  pt seen and evaluated by me , doing well, no complaints, continue routine postpartum care.

## 2023-09-12 PROBLEM — Z86.19 PERSONAL HISTORY OF OTHER INFECTIOUS AND PARASITIC DISEASES: Chronic | Status: ACTIVE | Noted: 2023-08-30

## 2023-10-17 ENCOUNTER — APPOINTMENT (OUTPATIENT)
Dept: OBGYN | Facility: CLINIC | Age: 38
End: 2023-10-17
Payer: COMMERCIAL

## 2023-10-17 VITALS
WEIGHT: 168 LBS | HEIGHT: 63 IN | DIASTOLIC BLOOD PRESSURE: 81 MMHG | SYSTOLIC BLOOD PRESSURE: 117 MMHG | HEART RATE: 70 BPM | BODY MASS INDEX: 29.77 KG/M2

## 2023-10-17 DIAGNOSIS — R39.9 UNSPECIFIED SYMPTOMS AND SIGNS INVOLVING THE GENITOURINARY SYSTEM: ICD-10-CM

## 2023-10-17 PROCEDURE — 0503F POSTPARTUM CARE VISIT: CPT

## 2023-10-17 RX ORDER — ALPRAZOLAM 2 MG/1
TABLET ORAL
Refills: 0 | Status: ACTIVE | COMMUNITY

## 2023-10-17 RX ORDER — SERTRALINE HYDROCHLORIDE 25 MG/1
TABLET, FILM COATED ORAL
Refills: 0 | Status: ACTIVE | COMMUNITY

## 2023-12-05 LAB — BACTERIA UR CULT: ABNORMAL

## 2024-01-01 NOTE — PATIENT PROFILE OB - POST PARTUM DEPRESSION SCREEN OB 4
[TextBox_4] : Carlos is here today for evaluation.  He was born at 31 weeks after an unassisted conception and uneventful pregnancy and delivery.  A deformity of the penis was detected in the nursery which prevented circumcision as . Making ample wet diapers.  No infections.
no

## 2024-05-06 NOTE — ED ADULT TRIAGE NOTE - BP NONINVASIVE DIASTOLIC (MM HG)
Physical Therapy Visit    Visit Type: Daily Treatment Note  Visit: 13  Referring Provider: Luzma Lora MD  Medical Diagnosis (from order): Z98.2 - Status post ventriculoperitoneal shunt     SUBJECTIVE                                                                                                               -Patient describes stiffness at left shoulder. Has had some relief after pool sessions, mild soreness.     Pain / Symptoms  - Stiffness rating (out of 10): Current stiffness: left shoulder.      OBJECTIVE                                                                                                                                     Treatment     Aquatic    AQUATIC THERAPY: In pool of 93 degrees.    Gait:  Forward / retro: x 4 laps   Sidestepping: x 4 laps     Seated:  Bicycle: x 2 minutes   Full arc quadriceps / hamstring curl: x 10 together and opposing   Hip abduction/ adduction: x 10   Hip external rotation: x 10    Lower Extremity:  Hamstring stretch at step: 2 x 20 seconds   Calf stretch: 2 x 20 seconds   Quadriceps stretch: 20 seconds x 2 with back to wall and foot on wall  Piriformis stretch: 20 seconds x 2     Lower Extremity Range of Motion without upper extremity support:  Heel raises: x 10  Hamstring curl: x 10  Hip extension with upper extremity support: x 10  Hip abduction / adduction: x 10  Marching: x 1 minute  Squats: x 10    Core stabilization in squat with back to wall:  - marching x 1 minute   - bilateral shoulder flexion x 10 together and opposing  - hip flexion with opposite shoulder extension x 10 each side     for core stabilization and balance:  Bilateral shoulder flexion / extension: x 10 together and opposing  Bilateral shoulder horizontal abduction / adduction: x 10  Row: x 10  Reciprocal punch: x 1 minute    Cueing throughout session to limit movements as needed to limit pain.    Home Exercise Program  Access Code: [email or web address]  URL:  [email or web address]  Date: 03/21/2024  Prepared by: Donna Johnson    Exercises  - Sit to Stand with Counter Support  - 1 x daily - 7 x weekly - 2 sets - 10 reps  - Seated Long Arc Quad  - 1 x daily - 7 x weekly - 2 sets - 10 reps  - Seated March  - 1 x daily - 7 x weekly - 2 sets - 10 reps  - Seated Hip Abduction  - 1 x daily - 7 x weekly - 2 sets - 10 reps  - Roller Massage Elongated IT Band Release  - 1 x daily - 7 x weekly - 2 sets - 10 reps      ASSESSMENT                                                                                                            -Patient tolerates progression of balance exercises working away from upper extremity support at railing with standing exercises.   -Tolerated addition of core stabilization exercises and quadriceps stretch.  -Only stiffness noted at left shoulder before and after session.   Pain after session: \"Just stiff.\"  Education:   - Results of above outlined education: Verbalizes understanding and Needs reinforcement    PLAN                                                                                                                           Suggestions for next session as indicated: Pool:  Continue gradual progression of hip stability, lower extremity strengthening as tolerated.  Begin steps ups.       Therapy procedure time and total treatment time can be found documented on the Time Entry flowsheet     82

## 2024-05-28 NOTE — OB RN TRIAGE NOTE - BP NONINVASIVE DIASTOLIC (MM HG)
Subjective:     Patient ID: Apurva Dumont is a 27 y.o. female.    [facility] Clinical Progress Notes      Specialized Services Documentation  Therapist must complete separate progress note for each specific clinical activity in which the individual participated during the day.     Education Therapy     [id number] Apurva Dumont engaged throughout the treatment day. Was engaged in learning related to Illness, Aftercare, and Wellness Tools. Staff utilized Verbal, Written, and Demonstration teaching methods.  Apurva Dumont shared area of learning and set a goal for outside of program to review the material from today with her supports.      Tx Plan Objective: 1.4, Therapist:  Sierra Carmichael MT-BC Ashley Calvo    67

## 2024-06-06 NOTE — DISCHARGE NOTE ANTEPARTUM - NSCORESITESY/N_GEN_A_CORE_RD
[facility] Progress Note    Date: 2024    Name: Iraida Murillo    MRN: [id number]         : 1998    Venofer Infusion #3 of 5    Ms. Jayesh Murillo to [facility], ambulatory, at 0907. Pt was assessed and education was provided. She c/o loose stools after her last infusion, treated with immodium.     Ms. Jayesh Murillo's vitals were reviewed and patient was observed for 5 minutes prior to treatment.   Vitals:    24 0900   BP: 118/72   Pulse: 83   Resp: 16   Temp: 98 °F (36.7 °C)   SpO2: 98%       24 g PIV placed in patient's left forearm x 1 attempt. PIV flushed easily with 10 ml NS, and brisk brisk blood return was verified.     250 ml IV bag of NS running at KVO.    Venofer 200 mg was infused at 440 ml/hr over approximately 15 minutes as per MD order.       Ms. Jayesh Murillo tolerated the infusion, and had no complaints. VS stable.     Pt observed for 30 minutes following infusion, NS remained at KVO, VS remained stable.   Patient Vitals for the past 12 hrs:   Temp Pulse Resp BP SpO2   24 1130 98.2 °F (36.8 °C) 86 16 105/65 98 %   24 1049 98.2 °F (36.8 °C) 92 16 (!) 95/51 97 %   24 0900 98 °F (36.7 °C) 83 16 118/72 98 %       Pt tolerated well, no complaints voiced.     PIV removed/intact. No bleeding or hematoma noted at site. Gauze and coban applied.    Patient's armband removed and shredded    [facility]esh [facility] at 1132. She is scheduled to return on 6/10/24 at 1300 for her next Venofer infusion.     Lilian Obregon RN  2024  10:39 AM  
No

## 2024-07-11 NOTE — OB PROVIDER DELIVERY SUMMARY - NSOBVTERISKASSESS_OBGYN_ALL_OB_FT
Left message for patient to call the office to r/s missed appt   OBPostPartum Assessment Completed on: 30-Aug-2023 22:58

## 2024-08-16 NOTE — PATIENT PROFILE OB - WEIGHT: PREPREGNANCY IN LBS
Anesthesia Volume In Cc: 0.5 Render Note In Bullet Format When Appropriate: No Medical Necessity Information: It is in your best interest to select a reason for this procedure from the list below. All of these items fulfill various CMS LCD requirements except the new and changing color options. Detail Level: Detailed Consent: The patient's consent was obtained including but not limited to risks of crusting, scabbing, blistering, scarring, darker or lighter pigmentary change, recurrence, incomplete removal and infection. Medical Necessity Clause: This procedure was medically necessary because the lesions that were treated were: Post-Care Instructions: I reviewed with the patient in detail post-care instructions. Patient is to wear sunprotection, and avoid picking at any of the treated lesions. Pt may apply Vaseline to crusted or scabbing areas. Treatment Number (Will Not Render If 0): 0 130

## 2024-10-27 ENCOUNTER — INPATIENT (INPATIENT)
Facility: HOSPITAL | Age: 39
LOS: 0 days | Discharge: HOME | End: 2024-10-28
Attending: STUDENT IN AN ORGANIZED HEALTH CARE EDUCATION/TRAINING PROGRAM | Admitting: STUDENT IN AN ORGANIZED HEALTH CARE EDUCATION/TRAINING PROGRAM
Payer: COMMERCIAL

## 2024-10-27 VITALS
HEIGHT: 63 IN | DIASTOLIC BLOOD PRESSURE: 81 MMHG | WEIGHT: 190.04 LBS | HEART RATE: 87 BPM | SYSTOLIC BLOOD PRESSURE: 120 MMHG | TEMPERATURE: 98 F | OXYGEN SATURATION: 100 % | RESPIRATION RATE: 16 BRPM

## 2024-10-27 DIAGNOSIS — Z98.890 OTHER SPECIFIED POSTPROCEDURAL STATES: Chronic | ICD-10-CM

## 2024-10-27 DIAGNOSIS — K81.0 ACUTE CHOLECYSTITIS: ICD-10-CM

## 2024-10-27 LAB
ALBUMIN SERPL ELPH-MCNC: 4.3 G/DL — SIGNIFICANT CHANGE UP (ref 3.3–5)
ALP SERPL-CCNC: 95 U/L — SIGNIFICANT CHANGE UP (ref 40–120)
ALT FLD-CCNC: 25 U/L — SIGNIFICANT CHANGE UP (ref 4–33)
ANION GAP SERPL CALC-SCNC: 11 MMOL/L — SIGNIFICANT CHANGE UP (ref 7–14)
APPEARANCE UR: ABNORMAL
APTT BLD: 31.5 SEC — SIGNIFICANT CHANGE UP (ref 24.5–35.6)
AST SERPL-CCNC: 47 U/L — HIGH (ref 4–32)
BACTERIA # UR AUTO: ABNORMAL /HPF
BASOPHILS # BLD AUTO: 0.03 K/UL — SIGNIFICANT CHANGE UP (ref 0–0.2)
BASOPHILS NFR BLD AUTO: 0.3 % — SIGNIFICANT CHANGE UP (ref 0–2)
BILIRUB SERPL-MCNC: 0.5 MG/DL — SIGNIFICANT CHANGE UP (ref 0.2–1.2)
BILIRUB UR-MCNC: ABNORMAL
BLD GP AB SCN SERPL QL: NEGATIVE — SIGNIFICANT CHANGE UP
BLOOD GAS VENOUS COMPREHENSIVE RESULT: SIGNIFICANT CHANGE UP
BUN SERPL-MCNC: 12 MG/DL — SIGNIFICANT CHANGE UP (ref 7–23)
CALCIUM SERPL-MCNC: 8.8 MG/DL — SIGNIFICANT CHANGE UP (ref 8.4–10.5)
CAST: 0 /LPF — SIGNIFICANT CHANGE UP (ref 0–4)
CHLORIDE SERPL-SCNC: 107 MMOL/L — SIGNIFICANT CHANGE UP (ref 98–107)
CO2 SERPL-SCNC: 23 MMOL/L — SIGNIFICANT CHANGE UP (ref 22–31)
COLOR SPEC: ABNORMAL
CREAT SERPL-MCNC: 0.78 MG/DL — SIGNIFICANT CHANGE UP (ref 0.5–1.3)
DIFF PNL FLD: ABNORMAL
EGFR: 100 ML/MIN/1.73M2 — SIGNIFICANT CHANGE UP
EOSINOPHIL # BLD AUTO: 0.06 K/UL — SIGNIFICANT CHANGE UP (ref 0–0.5)
EOSINOPHIL NFR BLD AUTO: 0.5 % — SIGNIFICANT CHANGE UP (ref 0–6)
GLUCOSE SERPL-MCNC: 92 MG/DL — SIGNIFICANT CHANGE UP (ref 70–99)
GLUCOSE UR QL: NEGATIVE MG/DL — SIGNIFICANT CHANGE UP
HCT VFR BLD CALC: 41.6 % — SIGNIFICANT CHANGE UP (ref 34.5–45)
HGB BLD-MCNC: 14.2 G/DL — SIGNIFICANT CHANGE UP (ref 11.5–15.5)
IANC: 8.45 K/UL — HIGH (ref 1.8–7.4)
IMM GRANULOCYTES NFR BLD AUTO: 0.3 % — SIGNIFICANT CHANGE UP (ref 0–0.9)
INR BLD: 1.01 RATIO — SIGNIFICANT CHANGE UP (ref 0.85–1.16)
KETONES UR-MCNC: 15 MG/DL
LEUKOCYTE ESTERASE UR-ACNC: ABNORMAL
LIDOCAIN IGE QN: 48 U/L — SIGNIFICANT CHANGE UP (ref 7–60)
LYMPHOCYTES # BLD AUTO: 1.72 K/UL — SIGNIFICANT CHANGE UP (ref 1–3.3)
LYMPHOCYTES # BLD AUTO: 15.5 % — SIGNIFICANT CHANGE UP (ref 13–44)
MAGNESIUM SERPL-MCNC: 2 MG/DL — SIGNIFICANT CHANGE UP (ref 1.6–2.6)
MCHC RBC-ENTMCNC: 30.4 PG — SIGNIFICANT CHANGE UP (ref 27–34)
MCHC RBC-ENTMCNC: 34.1 GM/DL — SIGNIFICANT CHANGE UP (ref 32–36)
MCV RBC AUTO: 89.1 FL — SIGNIFICANT CHANGE UP (ref 80–100)
MONOCYTES # BLD AUTO: 0.81 K/UL — SIGNIFICANT CHANGE UP (ref 0–0.9)
MONOCYTES NFR BLD AUTO: 7.3 % — SIGNIFICANT CHANGE UP (ref 2–14)
NEUTROPHILS # BLD AUTO: 8.45 K/UL — HIGH (ref 1.8–7.4)
NEUTROPHILS NFR BLD AUTO: 76.1 % — SIGNIFICANT CHANGE UP (ref 43–77)
NITRITE UR-MCNC: NEGATIVE — SIGNIFICANT CHANGE UP
NRBC # BLD: 0 /100 WBCS — SIGNIFICANT CHANGE UP (ref 0–0)
NRBC # FLD: 0 K/UL — SIGNIFICANT CHANGE UP (ref 0–0)
PH UR: 5.5 — SIGNIFICANT CHANGE UP (ref 5–8)
PHOSPHATE SERPL-MCNC: 2.8 MG/DL — SIGNIFICANT CHANGE UP (ref 2.5–4.5)
PLATELET # BLD AUTO: 264 K/UL — SIGNIFICANT CHANGE UP (ref 150–400)
POTASSIUM SERPL-MCNC: 3.5 MMOL/L — SIGNIFICANT CHANGE UP (ref 3.5–5.3)
POTASSIUM SERPL-SCNC: 3.5 MMOL/L — SIGNIFICANT CHANGE UP (ref 3.5–5.3)
PROT SERPL-MCNC: 7.3 G/DL — SIGNIFICANT CHANGE UP (ref 6–8.3)
PROT UR-MCNC: 100 MG/DL
PROTHROM AB SERPL-ACNC: 11.7 SEC — SIGNIFICANT CHANGE UP (ref 9.9–13.4)
RBC # BLD: 4.67 M/UL — SIGNIFICANT CHANGE UP (ref 3.8–5.2)
RBC # FLD: 12.6 % — SIGNIFICANT CHANGE UP (ref 10.3–14.5)
RBC CASTS # UR COMP ASSIST: 50 /HPF — HIGH (ref 0–4)
REVIEW: SIGNIFICANT CHANGE UP
RH IG SCN BLD-IMP: POSITIVE — SIGNIFICANT CHANGE UP
SODIUM SERPL-SCNC: 141 MMOL/L — SIGNIFICANT CHANGE UP (ref 135–145)
SP GR SPEC: 1.03 — HIGH (ref 1–1.03)
SQUAMOUS # UR AUTO: 37 /HPF — HIGH (ref 0–5)
UROBILINOGEN FLD QL: 1 MG/DL — SIGNIFICANT CHANGE UP (ref 0.2–1)
WBC # BLD: 11.1 K/UL — HIGH (ref 3.8–10.5)
WBC # FLD AUTO: 11.1 K/UL — HIGH (ref 3.8–10.5)
WBC UR QL: 15 /HPF — HIGH (ref 0–5)

## 2024-10-27 PROCEDURE — 76705 ECHO EXAM OF ABDOMEN: CPT | Mod: 26

## 2024-10-27 PROCEDURE — 99222 1ST HOSP IP/OBS MODERATE 55: CPT | Mod: GC,57

## 2024-10-27 PROCEDURE — 99285 EMERGENCY DEPT VISIT HI MDM: CPT

## 2024-10-27 RX ORDER — KETOROLAC TROMETHAMINE 30 MG/ML
15 INJECTION INTRAMUSCULAR; INTRAVENOUS ONCE
Refills: 0 | Status: DISCONTINUED | OUTPATIENT
Start: 2024-10-27 | End: 2024-10-27

## 2024-10-27 RX ORDER — SODIUM CHLORIDE 9 MG/ML
1000 INJECTION, SOLUTION INTRAMUSCULAR; INTRAVENOUS; SUBCUTANEOUS ONCE
Refills: 0 | Status: COMPLETED | OUTPATIENT
Start: 2024-10-27 | End: 2024-10-27

## 2024-10-27 RX ORDER — ONDANSETRON HYDROCHLORIDE 2 MG/ML
4 INJECTION, SOLUTION INTRAMUSCULAR; INTRAVENOUS ONCE
Refills: 0 | Status: COMPLETED | OUTPATIENT
Start: 2024-10-27 | End: 2024-10-27

## 2024-10-27 RX ADMIN — SODIUM CHLORIDE 1000 MILLILITER(S): 9 INJECTION, SOLUTION INTRAMUSCULAR; INTRAVENOUS; SUBCUTANEOUS at 20:27

## 2024-10-27 RX ADMIN — KETOROLAC TROMETHAMINE 15 MILLIGRAM(S): 30 INJECTION INTRAMUSCULAR; INTRAVENOUS at 20:27

## 2024-10-27 RX ADMIN — KETOROLAC TROMETHAMINE 15 MILLIGRAM(S): 30 INJECTION INTRAMUSCULAR; INTRAVENOUS at 21:27

## 2024-10-27 RX ADMIN — ONDANSETRON HYDROCHLORIDE 4 MILLIGRAM(S): 2 INJECTION, SOLUTION INTRAMUSCULAR; INTRAVENOUS at 20:27

## 2024-10-27 NOTE — ED PROVIDER NOTE - CLINICAL SUMMARY MEDICAL DECISION MAKING FREE TEXT BOX
39-year-old female with history of recently diagnosed gallstones in outpatient setting presenting with right upper quadrant pain that started a few hours ago.     Abdominal exam without peritoneal signs. No evidence of acute abdomen at this time. Well appearing. Symptoms are concerning for acute hepatobiliary disease (including acute cholecystitis), Low suspicion for acute pancreatitis, PUD (including perforation), acute infectious processes (pneumonia, hepatitis, pyelonephritis), acute appendicitis, vascular catastrophe, bowel obstruction or viscus perforation. Presentation not consistent with other acute, emergent causes of abdominal pain at this time.    Plan: labs, UA, US RUQ, EKG, pain control

## 2024-10-27 NOTE — ED ADULT TRIAGE NOTE - CHIEF COMPLAINT QUOTE
Pt c/o RUQ pain x few hours described as sharp, radiating to back. reports she has gallstones. endorsing mild nausea. denies fever, chills, diarrhea/constipation, chest pain, sob. pmhx: gallstones

## 2024-10-27 NOTE — ED ADULT NURSE NOTE - OBJECTIVE STATEMENT
39 y female with past medical history of gallstones c/o right upper abdominal pain, nausea, vomiting x1 day.  Pt stated that pain is  sharp and radiating to back. pt also reported dysuria, denies diarrhea.

## 2024-10-27 NOTE — ED ADULT TRIAGE NOTE - NS ED TRIAGE AVPU SCALE
Not applicable (known HIV positive status) Alert-The patient is alert, awake and responds to voice. The patient is oriented to time, place, and person. The triage nurse is able to obtain subjective information.

## 2024-10-27 NOTE — ED PROVIDER NOTE - OBJECTIVE STATEMENT
39-year-old female with history of recently diagnosed gallstones in outpatient setting presenting with right upper quadrant pain that started a few hours ago.  Patient states she has been experiencing right upper quadrant pain for the past several weeks which is why she had her outpatient ultrasound done however the pain today has been more persistent and has not resolved after taking her Gas-X.  Patient states since being in the waiting room she has had 3 episodes of NBNB emesis that looks mostly like her food.  Denies hematemesis or coffee-ground emesis.    Denies prior AP surgeries.    Denies fevers, chills, chest pain, shortness of breath, palpitations, diarrhea, melena, hematochezia, dysuria, change in urinary frequency, hematuria, rash, lightheadedness

## 2024-10-27 NOTE — ED PROVIDER NOTE - ATTENDING APP SHARED VISIT CONTRIBUTION OF CARE
Discussion/Summary   Your pap smear is normal, please call us with any questions or concerns.  Have a great day.        Verified Results  PAP WITH HIGH RISK HPV REFLEX [id number] 12:01AM BEA CHRISTIANSEN     Test Name Result Flag Reference   PAP WITH HIGH RISK HPV REFLEX (Report) O    Name: LIS MART       MRN:   [id number]   : 1986           Visit#: [id number]                Gynecologic Cytology Consultation Report      Client:  [facility] [place]      Date Specimen Collected: 17      Accession #: [id number]   Date Specimen Received: 17      Requisition   #:[id number]   Date Reported:      10/4/2017 16:44  Location:   [facility] [place] [facility] [place]      ______________________________________________________________________________   Cytologic Interpretation :      Negative for intraepithelial lesion or malignancy.       Satisfactory for evaluation. Presence of endocervical/transformation zone   component.         DEANNA Pedroza(ASCP)   ** Electronic Signature (CR) 10/4/2017  16:44 **      Educational note: The Pap test is a screening test with a well-recognized   false negative rate. The best means available to lower the false negative   rate and to detect early cervical lesions is a Pap test at regular intervals.    All ThinPrep Paps will be reviewed with the aid of the ThinPrep Imaging   System, unless otherwise specified.      ______________________________________________________________________________   Clinical Information:   Cancer Hx: None   Menstrual Hx: Irregular Menses   No Menstrual History Provided   Contraceptive Hx: Oral Contraceptive Pills   Other Clinical Conditions:Pap source: Cervical   REASON FOR COLLECTION::LOW RISK - ENCOUNTER FOR SCREENING FOR MALIGNANT   NEOPLASM OF CERVIX Z12.4   SOURCE::CERVICAL      Specimen(s) Submitted:    PAP with HPV Reflex (ThinPrep only)      ICD Codes:    Z01.419      Fee Codes:    A: [id number]       Performing Lab Location (Unless otherwise specified):   [facility]   [facility] [place] [address]        39-year-old female with history of recently diagnosed gallstones in outpatient setting presenting with right upper quadrant pain that started a few hours ago.  Patient states she has been experiencing right upper quadrant pain for the past several weeks which is why she had her outpatient ultrasound done however the pain today has been more persistent and has not resolved after taking her Gas-X.  Patient states since being in the waiting room she has had 3 episodes of NBNB emesis that looks mostly like her food.  Denies hematemesis or coffee-ground emesis.    tenderness to palpation RUQ with + murphys, neg mcburneys. no CVATB.     39-year-old female with history of recently diagnosed gallstones in outpatient setting presenting with right upper quadrant pain that started a few hours ago. Will require work up for likely gallstones vs cholecystitis. Will reassess after labs and imaging and pain control and nausea control.

## 2024-10-27 NOTE — ED PROVIDER NOTE - PROGRESS NOTE DETAILS
ALE [place]: Patient has WBC of 11.10, urine with small bili and leukoesterase however patient denies any urinary symptoms, I reviewed patient's ultrasound appears with gall stone and the cbd duct is dilated therefore I paged surgery.  Surgery will consult on patient shortly. ALE [place]: Patient seen by surgery advised admission under Dr. Jayro Dumont.  Asked if they would like us to start IV antibiotics, surg states they will place the orders in for the patient shortly so can hold off.

## 2024-10-28 ENCOUNTER — TRANSCRIPTION ENCOUNTER (OUTPATIENT)
Age: 39
End: 2024-10-28

## 2024-10-28 VITALS
HEART RATE: 85 BPM | TEMPERATURE: 98 F | SYSTOLIC BLOOD PRESSURE: 121 MMHG | DIASTOLIC BLOOD PRESSURE: 64 MMHG | OXYGEN SATURATION: 96 % | RESPIRATION RATE: 17 BRPM

## 2024-10-28 LAB — HCG SERPL-ACNC: <1 MIU/ML — SIGNIFICANT CHANGE UP

## 2024-10-28 PROCEDURE — 88304 TISSUE EXAM BY PATHOLOGIST: CPT | Mod: 26

## 2024-10-28 PROCEDURE — 74018 RADEX ABDOMEN 1 VIEW: CPT | Mod: 26

## 2024-10-28 PROCEDURE — 47563 LAPARO CHOLECYSTECTOMY/GRAPH: CPT | Mod: GC

## 2024-10-28 DEVICE — CLIP APPLIER TELEFLEX WECK AUTO 5MM X 35CM: Type: IMPLANTABLE DEVICE | Status: FUNCTIONAL

## 2024-10-28 DEVICE — CATH REDDICK W/TROCAR 4FRX50CM: Type: IMPLANTABLE DEVICE | Status: FUNCTIONAL

## 2024-10-28 DEVICE — CLIP APPLIER COVIDIEN ENDOCLIP III 5MM: Type: IMPLANTABLE DEVICE | Status: FUNCTIONAL

## 2024-10-28 RX ORDER — HEPARIN SODIUM 10000 [USP'U]/ML
5000 INJECTION INTRAVENOUS; SUBCUTANEOUS EVERY 8 HOURS
Refills: 0 | Status: DISCONTINUED | OUTPATIENT
Start: 2024-10-28 | End: 2024-10-28

## 2024-10-28 RX ORDER — HYDROMORPHONE HCL/0.9% NACL/PF 6 MG/30 ML
1 PATIENT CONTROLLED ANALGESIA SYRINGE INTRAVENOUS
Refills: 0 | Status: DISCONTINUED | OUTPATIENT
Start: 2024-10-28 | End: 2024-10-28

## 2024-10-28 RX ORDER — OXYCODONE HYDROCHLORIDE 30 MG/1
1 TABLET ORAL
Qty: 6 | Refills: 0
Start: 2024-10-28

## 2024-10-28 RX ORDER — CEFOTETAN DISODIUM 2 G
VIAL (EA) INJECTION
Refills: 0 | Status: DISCONTINUED | OUTPATIENT
Start: 2024-10-28 | End: 2024-10-28

## 2024-10-28 RX ORDER — OXYCODONE HYDROCHLORIDE 30 MG/1
5 TABLET ORAL EVERY 6 HOURS
Refills: 0 | Status: DISCONTINUED | OUTPATIENT
Start: 2024-10-28 | End: 2024-10-28

## 2024-10-28 RX ORDER — CEFOTETAN DISODIUM 2 G
2 VIAL (EA) INJECTION EVERY 12 HOURS
Refills: 0 | Status: DISCONTINUED | OUTPATIENT
Start: 2024-10-28 | End: 2024-10-28

## 2024-10-28 RX ORDER — ONDANSETRON HYDROCHLORIDE 2 MG/ML
4 INJECTION, SOLUTION INTRAMUSCULAR; INTRAVENOUS ONCE
Refills: 0 | Status: DISCONTINUED | OUTPATIENT
Start: 2024-10-28 | End: 2024-10-28

## 2024-10-28 RX ORDER — CEFOTETAN DISODIUM 2 G
2 VIAL (EA) INJECTION ONCE
Refills: 0 | Status: COMPLETED | OUTPATIENT
Start: 2024-10-28 | End: 2024-10-28

## 2024-10-28 RX ORDER — ACETAMINOPHEN 500 MG
975 TABLET ORAL EVERY 6 HOURS
Refills: 0 | Status: DISCONTINUED | OUTPATIENT
Start: 2024-10-28 | End: 2024-10-28

## 2024-10-28 RX ORDER — OXYCODONE HYDROCHLORIDE 30 MG/1
2.5 TABLET ORAL EVERY 6 HOURS
Refills: 0 | Status: DISCONTINUED | OUTPATIENT
Start: 2024-10-28 | End: 2024-10-28

## 2024-10-28 RX ORDER — HYDROMORPHONE HCL/0.9% NACL/PF 6 MG/30 ML
0.5 PATIENT CONTROLLED ANALGESIA SYRINGE INTRAVENOUS
Refills: 0 | Status: DISCONTINUED | OUTPATIENT
Start: 2024-10-28 | End: 2024-10-28

## 2024-10-28 RX ADMIN — OXYCODONE HYDROCHLORIDE 5 MILLIGRAM(S): 30 TABLET ORAL at 15:55

## 2024-10-28 RX ADMIN — HEPARIN SODIUM 5000 UNIT(S): 10000 INJECTION INTRAVENOUS; SUBCUTANEOUS at 14:25

## 2024-10-28 RX ADMIN — Medication 100 MILLILITER(S): at 03:40

## 2024-10-28 RX ADMIN — Medication 100 GRAM(S): at 03:40

## 2024-10-28 RX ADMIN — OXYCODONE HYDROCHLORIDE 5 MILLIGRAM(S): 30 TABLET ORAL at 16:55

## 2024-10-28 RX ADMIN — Medication 975 MILLIGRAM(S): at 19:42

## 2024-10-28 RX ADMIN — HEPARIN SODIUM 5000 UNIT(S): 10000 INJECTION INTRAVENOUS; SUBCUTANEOUS at 06:23

## 2024-10-28 RX ADMIN — Medication 975 MILLIGRAM(S): at 18:42

## 2024-10-28 NOTE — DISCHARGE NOTE PROVIDER - CARE PROVIDER_API CALL
Anita Dave  Surgery  [address] [place] [id number]  Phone: [phone]  Fax: [phone]  Follow Up Time: 1 week

## 2024-10-28 NOTE — DISCHARGE NOTE PROVIDER - HOSPITAL COURSE
39 F hx of gallstones presents with RUQ abd pain and vomiting. No fevers, chills, shortness of breath, headache. In the ED, vitals normal. WBC 11 and elevated AST 47. US Abd demonstrating  CBD 10 mm. Gallbladder: Distended with intraluminal stones. Wall measuring up to 0.34 cm. Sonographic Louise's sign was not obtained, possible distal CBD stone.     Pt was admitted under Surgery for further evaluation and management. Pt was taken to the OR on 10/28/24, and is s/p laparoscopic cholecystectomy. The patient tolerated the procedure well (see operative report for full details). Pt was transferred to the PACU in stable condition. In the PACU, the patient's pain was controlled and vital signs within normal limits. The patient was given regular food in PACU, and encouraged with early ambulation. On POC, the patient was recovering appropriately. The patient was transferred to the surgical floor in stable condition. Patient's pain is controlled with oral Tylenol with Oxycodone for breakthrough pain.    On the day of discharge, the patient's vital signs are within normal limits, pain is controlled, voiding urine, passing gas/stool, tolerating a low fiber diet, and ambulating well. Pt will f/u with Dr. Dave in 1-2 weeks. Pt will f/u with PCP in 1-2 weeks. Patient is in stable condition for discharge.

## 2024-10-28 NOTE — DISCHARGE NOTE NURSING/CASE MANAGEMENT/SOCIAL WORK - PATIENT PORTAL LINK FT
You can access the FollowMyHealth Patient Portal offered by [facility] by registering at the following website: [email or web address]. By joining [employer]’s FollowMyHealth portal, you will also be able to view your health information using other applications (apps) compatible with our system.

## 2024-10-28 NOTE — H&P ADULT - PATIENT'S GENDER IDENTITY
Sepsis Risk Score Assessment and Plan      Risk for early onset sepsis calculated using the [place] Sepsis Risk Calculator:     Note - The following table lists values used by the  Sepsis batch scoring system to calculate a risk score. Values listed as '0' may represent data that could not be found on the patient's chart and could impact the accuracy of the score.    Early Onset Sepsis Risk ([facility] National Average): 0.1000 Live Births   Gestational Age (Weeks)  (Min: 34  Max: 43) 38   Gestational Age (Days) 1   Highest Maternal Antepartum Temperature   (Min: 96 F  Max: 104 F) 37.2   Rupture of Membranes Duration 19.75 hours   Maternal GBS Status 2    Key   0 - Unknown   1 - Positive   2 - Negative   Type of Intrapartum Antibiotics Administered During Labor    Antibiotic Definition  GBS Specific: penicillin, ampicillin, clindamycin, erythromycin, cefazolin, vancomycin  Broad-Spectrum Antibiotics: other cephalosporins, fluoroquinolone, extended spectrum beta-lactam, or any IAP antibiotic plus an aminoglycoside 0        Key   0 - No antibiotics or any antibiotics less than 2 hrs prior to birth   1 - Group B strep specific antibiotics more than 2 hrs prior to birth   2 - Broad spectrum antibiotics 2-3.9 hrs prior to birth   3 - Broad spectrum antibiotics more than 4 hrs prior to birth       Website: [email or web address]   Risk of sepsis/1000 live births:   Overall score:0.29    Well score: 0.012  Equivocal score:  1.45  Ill score: 6.13  Action points (clinical condition and associated action): Bcx if equivocal, vitals q4 for 24 hrs, empiric abx and vitals per nicu if ill  
Female

## 2024-10-28 NOTE — H&P ADULT - ASSESSMENT
39 F hx of gallstones presents with RUQ abd pain and vomiting. Admitted for acute cholecystitis vs choledocholithiasis.    PLAN:  NPO  IVF  Abx  OR vs MRCP  SQH  No home meds    B team  [id number] 39 F hx of gallstones presents with RUQ abd pain and vomiting. Admitted for acute cholecystitis vs choledocholithiasis.    PLAN:  NPO  IVF  Abx  OR with LFTs after vs MRCP  SQH  No home meds    B team  [id number]

## 2024-10-28 NOTE — DISCHARGE NOTE PROVIDER - NSDCMRMEDTOKEN_GEN_ALL_CORE_FT
ibuprofen 600 mg oral tablet: 1 tab(s) orally every 6 hours as needed for  moderate pain  oxyCODONE 5 mg oral tablet: 1 tab(s) orally every 6 hours as needed for  severe pain MDD: 4 tabs  Prenatal Multivitamins with Folic Acid 1 mg oral tablet: 1 tab(s) orally once a day

## 2024-10-28 NOTE — PRE-OP CHECKLIST - IDENTIFICATION BAND VERIFIED
Pap smear result verified normal and HPV test as negative. Patient notified via [employer].      done

## 2024-10-28 NOTE — H&P ADULT - HISTORY OF PRESENT ILLNESS
39 F hx of gallstones presents with RUQ abd pain and vomiting. No fevers, chills, shortness of breath, headache. In the ED, vitals normal. WBC 11 and elevated AST 47. US Abd demonstrating  CBD 10 mm. Gallbladder: Distended with intraluminal stones. Wall measuring up to 0.34 cm. Sonographic Louise's sign was not obtained, possible distal CBD stone.

## 2024-10-28 NOTE — H&P ADULT - NSHPLABSRESULTS_GEN_ALL_CORE
14.2   11.10 )-----------( 264      ( 27 Oct 2024 20:30 )             41.6     10-27    141  |  107  |  12  ----------------------------<  92  3.5   |  23  |  0.78    Ca    8.8      27 Oct 2024 20:30  Phos  2.8     10-27  Mg     2.00     10-27    TPro  7.3  /  Alb  4.3  /  TBili  0.5  /  DBili  x   /  AST  47[H]  /  ALT  25  /  AlkPhos  95  10-27   LIVER FUNCTIONS - ( 27 Oct 2024 20:30 )  Alb: 4.3 g/dL / Pro: 7.3 g/dL / ALK PHOS: 95 U/L / ALT: 25 U/L / AST: 47 U/L / GGT: x           PT/INR - ( 27 Oct 2024 20:30 )   PT: 11.7 sec;   INR: 1.01 ratio         PTT - ( 27 Oct 2024 20:30 )  PTT:31.5 sec  ABO Interpretation: AB (10-27-24 @ 21:17)    IMAGING  ACC: [id number] EXAM:  US ABDOMEN RT UPR QUADRANT   ORDERED BY: EV REHMAN     PROCEDURE DATE:  10/27/2024          INTERPRETATION:  CLINICAL INFORMATION: Right upper quadrant pain    COMPARISON: None available.    TECHNIQUE: Sonography of the right upper quadrant.    FINDINGS:  Liver: Hepatomegaly.  Bile ducts: Normal caliber. Common bile duct measures 10 mm.  Gallbladder: Distended with intraluminal stones. Wall measuring up to   0.34 cm. Sonographic Louise's sign was not obtained, howeverthe patient   was premedicated prior to this examination.  Pancreas: Visualized portions are within normal limits.  Right kidney: 11.1 cm. No hydronephrosis.  Ascites: None.  IVC: Visualized portions are within normal limits.    IMPRESSION:  Distended gallbladder with cholelithiasis. Findings are equivocal for   acute cholecystitis. HIDA scan may be obtained for further evaluation as   clinically warranted.  Dilated common bile duct. Cannot occlude distal CBD stone. Consider MRCP   for further workup.        --- End of Report ---          KALEY BARAHONA DO; Resident Radiologist  This document has been electronically signed.  TRACI CORDERO MD; Attending Radiologist  This document has been electronically signed. Oct 27 2024 10:01PM

## 2024-10-28 NOTE — H&P ADULT - NSHPPHYSICALEXAM_GEN_ALL_CORE
General: NAD  Neuro: AO4  Resp: on room air, breathing comfortably  Abd: soft, RUQ TTP, not distended  Extremities: warm

## 2024-10-28 NOTE — PATIENT PROFILE ADULT - TOBACCO USE
Make an appointment with  clinic for a new cervical collar, some clinics in the area are:   [address] [facility] [place]  [place] [address]  Phone: [phone]  Fax: [phone]    [facility]  [address]  [place] [address]  Phone: [phone]  Fax: [phone]    [address] [facility]  [address]  [place] [address]  Phone: [phone]  Fax: [phone]    [facility]  [address]  [place] [address]  Phone: [phone]  Fax: [phone]    
Never smoker

## 2024-10-28 NOTE — DISCHARGE NOTE NURSING/CASE MANAGEMENT/SOCIAL WORK - FINANCIAL ASSISTANCE
[facility] provides services at a reduced cost to those who are determined to be eligible through [facility]’s financial assistance program. Information regarding [facility]’s financial assistance program can be found by going to [email or web address] or by calling [phone].

## 2024-10-29 LAB
CULTURE RESULTS: SIGNIFICANT CHANGE UP
SPECIMEN SOURCE: SIGNIFICANT CHANGE UP

## 2024-11-21 LAB — SURGICAL PATHOLOGY STUDY: SIGNIFICANT CHANGE UP

## 2024-11-26 ENCOUNTER — APPOINTMENT (OUTPATIENT)
Dept: TRAUMA SURGERY | Facility: HOSPITAL | Age: 39
End: 2024-11-26
Payer: COMMERCIAL

## 2024-11-26 VITALS
BODY MASS INDEX: 33.66 KG/M2 | WEIGHT: 190 LBS | TEMPERATURE: 98.1 F | HEIGHT: 63 IN | DIASTOLIC BLOOD PRESSURE: 55 MMHG | SYSTOLIC BLOOD PRESSURE: 99 MMHG | HEART RATE: 70 BPM

## 2024-11-26 PROCEDURE — 99024 POSTOP FOLLOW-UP VISIT: CPT

## 2024-12-26 NOTE — ED PROVIDER NOTE - ADMIT DISPOSITION PRESENT ON ADMISSION SEPSIS
Physical Therapy  10st    Patient not seen in therapy.     On hold due to nursing request and medical condition    Re-attempt plan: tomorrow    PT eval orders received and reviewed. RN requesting hold of evaluation at this time d/t limited command following. Agreeable for therapy to attempt evaluation next date as pt is appropriate. Will follow next date.       OBJECTIVE                         Documented in the chart in the following areas: Assessment/Plan.        Therapy procedure time and total treatment time can be found documented on the Time Entry flowsheet   No

## 2025-07-07 NOTE — DISCHARGE NOTE PROVIDER - NSDCQMSTROKE_NEU_ALL_CORE
Medication passed protocol.     Medication: blood glucose (True Metrix Blood Glucose Test) test strip  passed protocol.   Last office visit date: 6/17/2025  Next appointment scheduled?: Yes  1/6/2026   No

## (undated) DEVICE — STAPLER SKIN VISI-STAT 35 WIDE

## (undated) DEVICE — PACK MAJOR ABDOMINAL WITH LAP

## (undated) DEVICE — SOL IRR POUR NS 0.9% 1500ML

## (undated) DEVICE — WARMING BLANKET UPPER ADULT

## (undated) DEVICE — ELCTR BOVIE PENCIL SMOKE EVACUATION

## (undated) DEVICE — SUT MAXON 1 36" GS-24

## (undated) DEVICE — SUT VICRYL 0 18" TIES UNDYED

## (undated) DEVICE — SOL IRR POUR H2O 1500ML

## (undated) DEVICE — VENODYNE/SCD SLEEVE CALF MEDIUM

## (undated) DEVICE — POSITIONER STRAP ARMBOARD VELCRO TS-30

## (undated) DEVICE — SUT MONOCRYL 4-0 27" PS-2 UNDYED

## (undated) DEVICE — SUT VICRYL 3-0 18" SH (POP-OFF)

## (undated) DEVICE — DRSG STERISTRIPS 0.5 X 4"

## (undated) DEVICE — DRAPE LAPAROTOMY TRANSVERSE

## (undated) DEVICE — GLV 7.5 PROTEXIS (WHITE)

## (undated) DEVICE — SUT VICRYL PLUS 2-0 18" TIES UNDYED

## (undated) DEVICE — TROCAR COVIDIEN BLUNT TIP HASSAN 10MM STANDARD

## (undated) DEVICE — PROTECTOR HEEL / ELBOW FLUFFY

## (undated) DEVICE — DRAPE 3/4 SHEET 52X76"